# Patient Record
Sex: MALE | Race: BLACK OR AFRICAN AMERICAN | NOT HISPANIC OR LATINO | URBAN - METROPOLITAN AREA
[De-identification: names, ages, dates, MRNs, and addresses within clinical notes are randomized per-mention and may not be internally consistent; named-entity substitution may affect disease eponyms.]

---

## 2021-04-26 ENCOUNTER — INPATIENT (INPATIENT)
Facility: HOSPITAL | Age: 26
LOS: 1 days | Discharge: HOME | DRG: 975 | End: 2021-04-28
Attending: STUDENT IN AN ORGANIZED HEALTH CARE EDUCATION/TRAINING PROGRAM | Admitting: STUDENT IN AN ORGANIZED HEALTH CARE EDUCATION/TRAINING PROGRAM
Payer: COMMERCIAL

## 2021-04-26 VITALS
HEART RATE: 132 BPM | SYSTOLIC BLOOD PRESSURE: 98 MMHG | TEMPERATURE: 100 F | OXYGEN SATURATION: 99 % | RESPIRATION RATE: 18 BRPM | DIASTOLIC BLOOD PRESSURE: 64 MMHG | WEIGHT: 125 LBS

## 2021-04-26 DIAGNOSIS — R63.8 OTHER SYMPTOMS AND SIGNS CONCERNING FOOD AND FLUID INTAKE: ICD-10-CM

## 2021-04-26 DIAGNOSIS — D57.1 SICKLE-CELL DISEASE WITHOUT CRISIS: ICD-10-CM

## 2021-04-26 DIAGNOSIS — A41.9 SEPSIS, UNSPECIFIED ORGANISM: ICD-10-CM

## 2021-04-26 DIAGNOSIS — D47.3 ESSENTIAL (HEMORRHAGIC) THROMBOCYTHEMIA: ICD-10-CM

## 2021-04-26 DIAGNOSIS — D64.9 ANEMIA, UNSPECIFIED: ICD-10-CM

## 2021-04-26 DIAGNOSIS — B20 HUMAN IMMUNODEFICIENCY VIRUS [HIV] DISEASE: ICD-10-CM

## 2021-04-26 DIAGNOSIS — N28.9 DISORDER OF KIDNEY AND URETER, UNSPECIFIED: ICD-10-CM

## 2021-04-26 LAB
ALBUMIN SERPL ELPH-MCNC: 2.4 G/DL — LOW (ref 3.4–5)
ALP SERPL-CCNC: 175 U/L — HIGH (ref 40–120)
ALT FLD-CCNC: 86 U/L — HIGH (ref 12–42)
ANION GAP SERPL CALC-SCNC: 12 MMOL/L — SIGNIFICANT CHANGE UP (ref 9–16)
ANISOCYTOSIS BLD QL: SLIGHT — SIGNIFICANT CHANGE UP
APTT BLD: 33.1 SEC — SIGNIFICANT CHANGE UP (ref 27.5–35.5)
AST SERPL-CCNC: 119 U/L — HIGH (ref 15–37)
BASOPHILS # BLD AUTO: 0 K/UL — SIGNIFICANT CHANGE UP (ref 0–0.2)
BASOPHILS # BLD AUTO: 0 K/UL — SIGNIFICANT CHANGE UP (ref 0–0.2)
BASOPHILS NFR BLD AUTO: 0 % — SIGNIFICANT CHANGE UP (ref 0–2)
BASOPHILS NFR BLD AUTO: 0 % — SIGNIFICANT CHANGE UP (ref 0–2)
BILIRUB DIRECT SERPL-MCNC: 0.2 MG/DL — SIGNIFICANT CHANGE UP (ref 0–0.2)
BILIRUB INDIRECT FLD-MCNC: 0.2 MG/DL — SIGNIFICANT CHANGE UP (ref 0.2–1)
BILIRUB SERPL-MCNC: 0.4 MG/DL — SIGNIFICANT CHANGE UP (ref 0.2–1.2)
BILIRUB SERPL-MCNC: 0.6 MG/DL — SIGNIFICANT CHANGE UP (ref 0.2–1.2)
BLD GP AB SCN SERPL QL: NEGATIVE — SIGNIFICANT CHANGE UP
BLD GP AB SCN SERPL QL: NEGATIVE — SIGNIFICANT CHANGE UP
BUN SERPL-MCNC: 24 MG/DL — HIGH (ref 7–23)
CALCIUM SERPL-MCNC: 9.6 MG/DL — SIGNIFICANT CHANGE UP (ref 8.5–10.5)
CHLORIDE SERPL-SCNC: 96 MMOL/L — SIGNIFICANT CHANGE UP (ref 96–108)
CO2 SERPL-SCNC: 24 MMOL/L — SIGNIFICANT CHANGE UP (ref 22–31)
CREAT SERPL-MCNC: 2.08 MG/DL — HIGH (ref 0.5–1.3)
DACRYOCYTES BLD QL SMEAR: SLIGHT — SIGNIFICANT CHANGE UP
EOSINOPHIL # BLD AUTO: 0.03 K/UL — SIGNIFICANT CHANGE UP (ref 0–0.5)
EOSINOPHIL # BLD AUTO: 0.03 K/UL — SIGNIFICANT CHANGE UP (ref 0–0.5)
EOSINOPHIL NFR BLD AUTO: 0.9 % — SIGNIFICANT CHANGE UP (ref 0–6)
EOSINOPHIL NFR BLD AUTO: 1 % — SIGNIFICANT CHANGE UP (ref 0–6)
GIANT PLATELETS BLD QL SMEAR: PRESENT — SIGNIFICANT CHANGE UP
GLUCOSE SERPL-MCNC: 108 MG/DL — HIGH (ref 70–99)
HAPTOGLOB SERPL-MCNC: 621 MG/DL — HIGH (ref 34–200)
HCT VFR BLD CALC: 17.6 % — CRITICAL LOW (ref 39–50)
HCT VFR BLD CALC: 19 % — CRITICAL LOW (ref 39–50)
HGB BLD-MCNC: 5.4 G/DL — CRITICAL LOW (ref 13–17)
HGB BLD-MCNC: 6 G/DL — CRITICAL LOW (ref 13–17)
HYPOCHROMIA BLD QL: SIGNIFICANT CHANGE UP
HYPOCHROMIA BLD QL: SLIGHT — SIGNIFICANT CHANGE UP
INR BLD: 1.43 — HIGH (ref 0.88–1.16)
LACTATE SERPL-SCNC: 1.1 MMOL/L — SIGNIFICANT CHANGE UP (ref 0.4–2)
LIDOCAIN IGE QN: 24 U/L — SIGNIFICANT CHANGE UP (ref 7–60)
LYMPHOCYTES # BLD AUTO: 0.86 K/UL — LOW (ref 1–3.3)
LYMPHOCYTES # BLD AUTO: 0.87 K/UL — LOW (ref 1–3.3)
LYMPHOCYTES # BLD AUTO: 29 % — SIGNIFICANT CHANGE UP (ref 13–44)
LYMPHOCYTES # BLD AUTO: 30.3 % — SIGNIFICANT CHANGE UP (ref 13–44)
MACROCYTES BLD QL: SLIGHT — SIGNIFICANT CHANGE UP
MAGNESIUM SERPL-MCNC: 2 MG/DL — SIGNIFICANT CHANGE UP (ref 1.6–2.6)
MANUAL SMEAR VERIFICATION: SIGNIFICANT CHANGE UP
MANUAL SMEAR VERIFICATION: SIGNIFICANT CHANGE UP
MCHC RBC-ENTMCNC: 27.4 PG — SIGNIFICANT CHANGE UP (ref 27–34)
MCHC RBC-ENTMCNC: 28.3 PG — SIGNIFICANT CHANGE UP (ref 27–34)
MCHC RBC-ENTMCNC: 30.7 GM/DL — LOW (ref 32–36)
MCHC RBC-ENTMCNC: 31.6 GM/DL — LOW (ref 32–36)
MCV RBC AUTO: 89.3 FL — SIGNIFICANT CHANGE UP (ref 80–100)
MCV RBC AUTO: 89.6 FL — SIGNIFICANT CHANGE UP (ref 80–100)
METAMYELOCYTES # FLD: 1.8 % — HIGH (ref 0–0)
MONOCYTES # BLD AUTO: 0.36 K/UL — SIGNIFICANT CHANGE UP (ref 0–0.9)
MONOCYTES # BLD AUTO: 0.45 K/UL — SIGNIFICANT CHANGE UP (ref 0–0.9)
MONOCYTES NFR BLD AUTO: 12.5 % — SIGNIFICANT CHANGE UP (ref 2–14)
MONOCYTES NFR BLD AUTO: 15 % — HIGH (ref 2–14)
NEUTROPHILS # BLD AUTO: 1.53 K/UL — LOW (ref 1.8–7.4)
NEUTROPHILS # BLD AUTO: 1.65 K/UL — LOW (ref 1.8–7.4)
NEUTROPHILS NFR BLD AUTO: 49 % — SIGNIFICANT CHANGE UP (ref 43–77)
NEUTROPHILS NFR BLD AUTO: 51.8 % — SIGNIFICANT CHANGE UP (ref 43–77)
NEUTS BAND # BLD: 1.8 % — SIGNIFICANT CHANGE UP (ref 0–8)
NEUTS BAND # BLD: 6 % — SIGNIFICANT CHANGE UP (ref 0–8)
NRBC # BLD: 0 /100 — SIGNIFICANT CHANGE UP (ref 0–0)
NRBC # BLD: SIGNIFICANT CHANGE UP /100 WBCS (ref 0–0)
PLAT MORPH BLD: ABNORMAL
PLAT MORPH BLD: NORMAL — SIGNIFICANT CHANGE UP
PLATELET # BLD AUTO: 394 K/UL — SIGNIFICANT CHANGE UP (ref 150–400)
PLATELET # BLD AUTO: 412 K/UL — HIGH (ref 150–400)
POIKILOCYTOSIS BLD QL AUTO: SLIGHT — SIGNIFICANT CHANGE UP
POLYCHROMASIA BLD QL SMEAR: SLIGHT — SIGNIFICANT CHANGE UP
POLYCHROMASIA BLD QL SMEAR: SLIGHT — SIGNIFICANT CHANGE UP
POTASSIUM SERPL-MCNC: 4.1 MMOL/L — SIGNIFICANT CHANGE UP (ref 3.5–5.3)
POTASSIUM SERPL-SCNC: 4.1 MMOL/L — SIGNIFICANT CHANGE UP (ref 3.5–5.3)
PROT SERPL-MCNC: 10 G/DL — HIGH (ref 6.4–8.2)
PROTHROM AB SERPL-ACNC: 16.9 SEC — HIGH (ref 10.6–13.6)
RBC # BLD: 1.97 M/UL — LOW (ref 4.2–5.8)
RBC # BLD: 2.12 M/UL — LOW (ref 4.2–5.8)
RBC # FLD: 15 % — HIGH (ref 10.3–14.5)
RBC # FLD: 15.4 % — HIGH (ref 10.3–14.5)
RBC BLD AUTO: ABNORMAL
RBC BLD AUTO: ABNORMAL
RH IG SCN BLD-IMP: POSITIVE — SIGNIFICANT CHANGE UP
RH IG SCN BLD-IMP: POSITIVE — SIGNIFICANT CHANGE UP
SARS-COV-2 RNA SPEC QL NAA+PROBE: SIGNIFICANT CHANGE UP
SCHISTOCYTES BLD QL AUTO: SLIGHT — SIGNIFICANT CHANGE UP
SMUDGE CELLS # BLD: PRESENT — SIGNIFICANT CHANGE UP
SODIUM SERPL-SCNC: 132 MMOL/L — SIGNIFICANT CHANGE UP (ref 132–145)
TARGETS BLD QL SMEAR: SLIGHT — SIGNIFICANT CHANGE UP
VARIANT LYMPHS # BLD: 0.9 % — SIGNIFICANT CHANGE UP (ref 0–6)
WBC # BLD: 2.85 K/UL — LOW (ref 3.8–10.5)
WBC # BLD: 3 K/UL — LOW (ref 3.8–10.5)
WBC # FLD AUTO: 2.85 K/UL — LOW (ref 3.8–10.5)
WBC # FLD AUTO: 3 K/UL — LOW (ref 3.8–10.5)

## 2021-04-26 PROCEDURE — 70450 CT HEAD/BRAIN W/O DYE: CPT | Mod: 26

## 2021-04-26 PROCEDURE — 99285 EMERGENCY DEPT VISIT HI MDM: CPT | Mod: 25

## 2021-04-26 PROCEDURE — 99223 1ST HOSP IP/OBS HIGH 75: CPT

## 2021-04-26 PROCEDURE — 71045 X-RAY EXAM CHEST 1 VIEW: CPT | Mod: 26

## 2021-04-26 PROCEDURE — 93010 ELECTROCARDIOGRAM REPORT: CPT

## 2021-04-26 RX ORDER — CIPROFLOXACIN LACTATE 400MG/40ML
400 VIAL (ML) INTRAVENOUS EVERY 12 HOURS
Refills: 0 | Status: DISCONTINUED | OUTPATIENT
Start: 2021-04-26 | End: 2021-04-27

## 2021-04-26 RX ORDER — KETOROLAC TROMETHAMINE 30 MG/ML
15 SYRINGE (ML) INJECTION ONCE
Refills: 0 | Status: DISCONTINUED | OUTPATIENT
Start: 2021-04-26 | End: 2021-04-26

## 2021-04-26 RX ORDER — SODIUM CHLORIDE 9 MG/ML
1000 INJECTION INTRAMUSCULAR; INTRAVENOUS; SUBCUTANEOUS ONCE
Refills: 0 | Status: COMPLETED | OUTPATIENT
Start: 2021-04-26 | End: 2021-04-26

## 2021-04-26 RX ORDER — PIPERACILLIN AND TAZOBACTAM 4; .5 G/20ML; G/20ML
3.38 INJECTION, POWDER, LYOPHILIZED, FOR SOLUTION INTRAVENOUS ONCE
Refills: 0 | Status: COMPLETED | OUTPATIENT
Start: 2021-04-26 | End: 2021-04-26

## 2021-04-26 RX ORDER — MORPHINE SULFATE 50 MG/1
1 CAPSULE, EXTENDED RELEASE ORAL
Refills: 0 | Status: DISCONTINUED | OUTPATIENT
Start: 2021-04-26 | End: 2021-04-27

## 2021-04-26 RX ORDER — FLUCONAZOLE 150 MG/1
100 TABLET ORAL EVERY 24 HOURS
Refills: 0 | Status: DISCONTINUED | OUTPATIENT
Start: 2021-04-27 | End: 2021-04-28

## 2021-04-26 RX ORDER — METOCLOPRAMIDE HCL 10 MG
10 TABLET ORAL ONCE
Refills: 0 | Status: COMPLETED | OUTPATIENT
Start: 2021-04-26 | End: 2021-04-26

## 2021-04-26 RX ORDER — FLUCONAZOLE 150 MG/1
200 TABLET ORAL ONCE
Refills: 0 | Status: COMPLETED | OUTPATIENT
Start: 2021-04-26 | End: 2021-04-26

## 2021-04-26 RX ORDER — ACETAMINOPHEN 500 MG
1000 TABLET ORAL EVERY 6 HOURS
Refills: 0 | Status: DISCONTINUED | OUTPATIENT
Start: 2021-04-26 | End: 2021-04-27

## 2021-04-26 RX ORDER — SODIUM CHLORIDE 9 MG/ML
1750 INJECTION INTRAMUSCULAR; INTRAVENOUS; SUBCUTANEOUS ONCE
Refills: 0 | Status: COMPLETED | OUTPATIENT
Start: 2021-04-26 | End: 2021-04-26

## 2021-04-26 RX ORDER — ACETAMINOPHEN 500 MG
650 TABLET ORAL ONCE
Refills: 0 | Status: DISCONTINUED | OUTPATIENT
Start: 2021-04-26 | End: 2021-04-26

## 2021-04-26 RX ORDER — ACETAMINOPHEN 500 MG
650 TABLET ORAL ONCE
Refills: 0 | Status: COMPLETED | OUTPATIENT
Start: 2021-04-26 | End: 2021-04-26

## 2021-04-26 RX ORDER — ACETAMINOPHEN 500 MG
650 TABLET ORAL EVERY 4 HOURS
Refills: 0 | Status: DISCONTINUED | OUTPATIENT
Start: 2021-04-26 | End: 2021-04-26

## 2021-04-26 RX ORDER — METRONIDAZOLE 500 MG
500 TABLET ORAL EVERY 8 HOURS
Refills: 0 | Status: DISCONTINUED | OUTPATIENT
Start: 2021-04-26 | End: 2021-04-27

## 2021-04-26 RX ADMIN — Medication 10 MILLIGRAM(S): at 08:08

## 2021-04-26 RX ADMIN — FLUCONAZOLE 200 MILLIGRAM(S): 150 TABLET ORAL at 21:34

## 2021-04-26 RX ADMIN — PIPERACILLIN AND TAZOBACTAM 200 GRAM(S): 4; .5 INJECTION, POWDER, LYOPHILIZED, FOR SOLUTION INTRAVENOUS at 07:59

## 2021-04-26 RX ADMIN — Medication 200 MILLIGRAM(S): at 22:43

## 2021-04-26 RX ADMIN — Medication 15 MILLIGRAM(S): at 08:08

## 2021-04-26 RX ADMIN — Medication 650 MILLIGRAM(S): at 07:50

## 2021-04-26 RX ADMIN — SODIUM CHLORIDE 1000 MILLILITER(S): 9 INJECTION INTRAMUSCULAR; INTRAVENOUS; SUBCUTANEOUS at 13:13

## 2021-04-26 RX ADMIN — Medication 100 MILLIGRAM(S): at 19:47

## 2021-04-26 RX ADMIN — SODIUM CHLORIDE 1750 MILLILITER(S): 9 INJECTION INTRAMUSCULAR; INTRAVENOUS; SUBCUTANEOUS at 07:54

## 2021-04-26 NOTE — H&P ADULT - PROBLEM SELECTOR PLAN 1
pt w/ hx of HIV p/w tachycardia bandemia w/ n/v/hematochezia, subjective fever and chills. suspected GI source given recent bloody diarrhea. r/o colitis vs hepatitis vs pancreatitis. cholecystis unlikely. cxr unremarkable for pna. CT head w.o acute findings.   -s/p 2.75 L NS, zosyn 3.375g x1 in ED  -f/u UA w/ reflex  -f/u blood cultures  -f/u stool smear cultures, ova, parasites, c diff  -f/u HIV consult  -f/u hep panel  -f/u lipase   -f/u D-Dimer    #Hematochezia   pt w/ weeks of bloody diarrhea meeting sirs criteria. likely 2/2 to colitis  -plan as above  -consider CT abd/pelvis pt w/ hx of HIV p/w tachycardia bandemia w/ n/v/hematochezia, subjective fever and chills. suspected GI source given recent bloody diarrhea. r/o colitis vs hepatitis vs pancreatitis. cholecystis unlikely. cxr unremarkable for pna. CT head w.o acute findings.   -s/p 2.75 L NS, zosyn 3.375g x1 in ED  -f/u UA w/ reflex  -f/u blood cultures  -f/u stool smear cultures, ova, parasites, c diff  -f/u hep panel  -f/u lipase   -HIV consult, GI consult      #Hematochezia   pt w/ weeks of bloody diarrhea meeting sirs criteria. likely 2/2 to colitis  -plan as above  -consider CT abd/pelvis pt w/ hx of HIV p/w tachycardia bandemia w/ n/v/hematochezia, subjective fever and chills. suspected GI source given recent bloody diarrhea. r/o colitis vs hepatitis vs pancreatitis. cholecystis unlikely. cxr unremarkable for pna. CT head w.o acute findings.   -s/p 2.75 L NS, zosyn 3.375g x1 in ED  -f/u UA w/ reflex  -f/u blood cultures  -f/u stool smear cultures, ova, parasites, c diff, GI PCR  -f/u hep panel  -f/u lipase  -c/w cipro 400q12, flagyl 500q8  -GI following, recs pending  -needs ID/HIV consult      #Hematochezia   pt w/ weeks of bloody diarrhea meeting sirs criteria. likely 2/2 to colitis  -plan as above  -f/u CT abd/pelvis non con

## 2021-04-26 NOTE — H&P ADULT - PROBLEM SELECTOR PLAN 4
pt w/ hx of SS. p/w Hg of 6.0 w/ hematochezia  -pt bilirubin wnl  -f/u haptoglobin and iron studies  -transfuse Hg <7  -keep active T&S pt w/ hx of SS. p/w Hg of 6.0 w/ hematochezia  -pt bilirubin wnl  -f/u haptoglobin and iron studies  -ordered 2 units, f/u post trans cbc  -transfuse Hg <7  -keep active T&S

## 2021-04-26 NOTE — ED PROVIDER NOTE - OBJECTIVE STATEMENT
24 y/o male with PMH of Sickle Cell Anemia and HIV (CD4/VL unknown, noncompliant with meds) presents via EMS for 1 month of progressive generalized weakness, malaise, nonspecific abdominal discomfort, N/V/D. He reports having subjective fevers and chills but has not checked his temperature at home. The patient is uncooperative with much of the history, stating he is tired and hungry and is refusing to answer any further questions at this time, therefore limiting the remainder of the history.

## 2021-04-26 NOTE — ED ADULT TRIAGE NOTE - CHIEF COMPLAINT QUOTE
biba with complaints of abdominal pain, nausea, vomiting and diarrhea on and off for 1 month. Tachycardiac 130's, oral temp 100.0.

## 2021-04-26 NOTE — H&P ADULT - ATTENDING COMMENTS
26 yo male with a hx of Syphilis, SSA and HIV seen with sepsis, acute blood loss anemia, probable colitis, ENMANUEL and transaminitis. I saw and examined patient independently. I discussed the assessment and plan in details with the resident on duty. WIll plan to admit and transfuse 2 units of PRBC after stat type and cross match. Continue with zosyn empirically while awaiting results of UA, stool studies and blood culture. Plan is to tailor down the abx if indicated. Consult GI and HIV specialities. Repeat labs in am but will obtain post transfusional H/H 2 hours after transfusion is completed. a dose of 20 mg iv lasixs to be administered in b/w the 2 units.

## 2021-04-26 NOTE — ED PROVIDER NOTE - PROGRESS NOTE DETAILS
Labs reveal Hgb of 6.0 , Cr level of 2.08. Patient will not endorse his baseline Hgb level but states his last blood transfusion was in December 2020 at a hospital in the [place].

## 2021-04-26 NOTE — H&P ADULT - PROBLEM SELECTOR PLAN 5
pt w/ hx of SS. denies hx of admissions for SS crises. states last blood transfusion was 12/20. not on hydroxyurea  -hg on admission 6.0 likely 2/2 to hematochezia vs SS crisis (bili wnl)  -f/u haptoglobin and iron studies  -transfuse Hg <7  -keep active T&S  -Tylenol prn for pain

## 2021-04-26 NOTE — ED PROVIDER NOTE - CLINICAL SUMMARY MEDICAL DECISION MAKING FREE TEXT BOX
This is a 26 y/o male with Hx Sickle Cell Anemia and HIV (CD4/VL unknown, noncompliant with meds) BIBA for 1 month of progressive weakness, malaise, HA, N/V/D. Pt noted to be tachycardic and hypotensive with PO temp of 100 upon arrival. Sepsis code initiated, IVFs and IV ABX were started, antipyretics, labs drawn, EKG, imaging and repeated reassessments and further intervention as indicated pending labs and imaging results.

## 2021-04-26 NOTE — H&P ADULT - PROBLEM SELECTOR PLAN 3
pt p/w BUN/Cr 24/2.08. pt w.o known hx of CKD w/ weeks of N/V/D. lytes wnl  -s/p 2.750L NS in ED  -monitor UOP  -f/u am bmp  -consider urine lytes   -consider renal consult pt p/w BUN/Cr 24/2.08. pt w.o known hx of CKD w/ weeks of N/V/D. lytes wnl  -s/p 2.750L NS in ED  -monitor UOP  -f/u am bmp  -f/u urine lytes, serum osm  -consider renal consult

## 2021-04-26 NOTE — H&P ADULT - PROBLEM SELECTOR PLAN 7
Fluids: none  Electrolytes: Mg>2, K>4  Nutrition:  No IVF currently needed, replete lytes PRN  Prophylaxis: Lovenox   Activity: AAT, OOBTC  GI: none  C: FC  Dispo: Admit to [facility]

## 2021-04-26 NOTE — H&P ADULT - NSHPPHYSICALEXAM_GEN_ALL_CORE
PHYSICAL EXAM:    General: thin, NAD  HEENT: NC/AT; PERRL, anicteric sclera; thrush  Neck: supple, no lymphadenopathy  Cardiovascular: +S1/S2, RRR  Respiratory: CTA B/L; no W/R/R  Gastrointestinal: soft, generalized tenderness/ND; +BSx4, - [place] sign  Extremities: WWP; no edema, clubbing or cyanosis  Vascular: 2+ radial, DP/PT pulses B/L  Neurological: AAOx3; no focal deficits. hearing loss b/l  Psychiatric: pleasant mood and affect  Dermatologic: no appreciable wounds or damage to the skin

## 2021-04-26 NOTE — ED PROVIDER NOTE - CARE PLAN
Principal Discharge DX:	Symptomatic anemia  Secondary Diagnosis:	Renal insufficiency  Secondary Diagnosis:	Sepsis

## 2021-04-26 NOTE — ED PROVIDER NOTE - RAPID ASSESSMENT
pt with PMHx of HIV, non compliant with HAART, byt pt with PMHx of HIV, non compliant with HAART, on and off meds x few months, last admitted to hospital 12/2020, BIBA for generalized tingling, weakness, HA, nausea, diarrhea, and malaise x 1 month.  Noted tachycardiac and febrile on arrival, sepsis protocol initiated, weight based fluids, empiric IV abx, awaiting day team for full assessment

## 2021-04-26 NOTE — H&P ADULT - NSHPLABSRESULTS_GEN_ALL_CORE
.  LABS:                         6.0    3.00  )-----------( 412      ( 26 Apr 2021 08:02 )             19.0     04-26    132  |  96  |  24<H>  ----------------------------<  108<H>  4.1   |  24  |  2.08<H>    Ca    9.6      26 Apr 2021 08:02  Mg     2.0     04-26    TPro  10.0<H>  /  Alb  2.4<L>  /  TBili  0.6  /  DBili  x   /  AST  119<H>  /  ALT  86<H>  /  AlkPhos  175<H>  04-26    PT/INR - ( 26 Apr 2021 08:02 )   PT: 16.9 sec;   INR: 1.43          PTT - ( 26 Apr 2021 08:02 )  PTT:33.1 sec      Lactate, Blood: 1.1 mmoL/L (04-26 @ 08:02)      RADIOLOGY, EKG & ADDITIONAL TESTS: Reviewed.

## 2021-04-26 NOTE — H&P ADULT - HISTORY OF PRESENT ILLNESS
HPI: Mr Renteria is a 26yo male w/ pmh of SS and HIV (diagnosed 6 years ago not on HAART) presenting from [facility] w/ 2-3 weeks of nausea, non bloody non bilious vomiting, diarreah w/ bright red blood and generalized abdominal pain. pt states he was unable to leave the restroom due to constant diarrhea and the abdominal pain became unbearable which prompted him to go tot he ED. pt also endorsing 2 days of subjective fever and chill. sore throat and hearing loss, HA (photophobia, neck stiffness or vision loss). pt endorsing what he describes as a tingling sensation in his penis and urine frequency. denies dysuria or penile sores/ulcers. denies recent sexual activity. In regard to SS pt states he has never had a SS crisis, has never been admitted to the hospital for SS. last blood transfusion 12/2020. In regard to HIV pt states he was diagnosed approx. 6 years ago. would not answer how he contracted the virus. does not take HAART. does not follow with a PCP. has never been hospitalized for HIV in the past  -ROS otherwise negative    In the ED:  Initial vital signs: T: 100F, HR: 132, BP: 98/64, R: 18, SpO2: 99% on RA  Labs: significant for wbc 3, Hg 6, plt 412, 6% bands, 15% monocytes, lactate 1.1, PTT/PT/INR 33.1/16.9/1.43, BUN/Cr 24/2.08, bili 0.6, alk phos 175, ast/alt 119/86,   Imaging: CT head for No acute intracranial hemorrhage, transcortical infarction or mass effect.  CXR: unremarkable   EKG: sinus tachycardia, normal axis, pr qrs wnl, no acute ischemic changes  Medications: acetaminophen 650mg PO, ketorolac 15mg ivp x1,  reglan 10mg ivp, zosyn 3.375mg, 1750 IV bolus NS, 1L NS bolus  Consults: none      HPI: Mr Renteria is a 24yo male w/ pmh of SS and HIV (diagnosed 6 years ago not on HAART) presenting from [facility] w/ 2-3 weeks of nausea, non bloody non bilious vomiting, diarreah w/ bright red blood and generalized abdominal pain. pt states he was unable to leave the restroom due to constant diarrhea and the abdominal pain became unbearable which prompted him to go tot he ED. pt also endorsing 2 days of subjective fever and chill. sore throat and hearing loss, HA (photophobia, neck stiffness or vision loss). pt endorsing what he describes as a tingling sensation in his penis and urine frequency. denies dysuria or penile sores/ulcers. denies recent sexual activity. In regard to SS pt states he has never had a SS crisis, has never been admitted to the hospital for SS. last blood transfusion 12/2020 (he denies being in crisis at the time or actively bleeding). In regard to HIV pt states he was diagnosed approx. 6 years ago. would not answer how he contracted the virus. does not take HAART. does not follow with a PCP. has never been hospitalized for HIV in the past  -ROS otherwise negative    In the ED:  Initial vital signs: T: 100F, HR: 132, BP: 98/64, R: 18, SpO2: 99% on RA  Labs: significant for wbc 3.0, Hg 6.0, plt 412, 6% bands, 15% monocytes, lactate 1.1, PTT/PT/INR 33.1/16.9/1.43, BUN/Cr 24/2.08, bili 0.6, alk phos 175, ast/alt 119/86, COVID -  Imaging: CT head for No acute intracranial hemorrhage, transcortical infarction or mass effect.  CXR: unremarkable   EKG: sinus tachycardia, normal axis, pr qrs wnl, no acute ischemic changes  Medications: acetaminophen 650mg PO, ketorolac 15mg ivp x1,  reglan 10mg ivp, zosyn 3.375mg, 1750 IV bolus NS, 1L NS bolus  Consults: none      HPI: Mr Renteria is a 24yo male w/ pmh of SS and HIV (diagnosed 6 years ago not on HAART) presenting from [facility] w/ 2-3 weeks of nausea, non bloody non bilious vomiting, diarreah w/ bright red blood and generalized abdominal pain. pt states he was unable to leave the restroom due to constant diarrhea and the abdominal pain became unbearable which prompted him to go tot he ED. pt also endorsing 2 days of subjective fever and chill. sore throat and hearing loss, HA (without photophobia, neck stiffness or vision loss). pt endorsing what he describes as a tingling sensation in his penis and urine frequency. denies dysuria or penile sores/ulcers. denies recent sexual activity. In regard to SS pt states he has never had a SS crisis, has never been admitted to the hospital for SS. last blood transfusion 12/2020 (he denies being in crisis at the time or actively bleeding). In regard to HIV pt states he was diagnosed approx. 6 years ago. would not answer how he contracted the virus. does not take HAART. does not follow with a PCP. has never been hospitalized for HIV in the past  -ROS otherwise negative    In the ED:  Initial vital signs: T: 100F, HR: 132, BP: 98/64, R: 18, SpO2: 99% on RA  Labs: significant for wbc 3.0, Hg 6.0, plt 412, 6% bands, 15% monocytes, lactate 1.1, PTT/PT/INR 33.1/16.9/1.43, BUN/Cr 24/2.08, bili 0.6, alk phos 175, ast/alt 119/86, COVID -  Imaging: CT head for No acute intracranial hemorrhage, transcortical infarction or mass effect.  CXR: unremarkable   EKG: sinus tachycardia, normal axis, pr qrs wnl, no acute ischemic changes  Medications: acetaminophen 650mg PO, ketorolac 15mg ivp x1,  reglan 10mg ivp, zosyn 3.375mg, 1750 IV bolus NS, 1L NS bolus  Consults: none

## 2021-04-26 NOTE — H&P ADULT - PROBLEM SELECTOR PLAN 2
Pt w/ approx year hx of HIV. not on HAART. p/w leukopenia wbc 3.0 and oral thrush. pt would not share how he mae the virus. states he is currently not sexually active. not cooperative on exam.   -f/u GC/Chl urine  -f/u T cell subset, viral load  -HIV consult    #Thrush  -pt w/ hx of HIV and leukopenia p/w oral thrush.   -c/w oral fluconazole Pt w/ approx year hx of HIV. not on HAART. p/w leukopenia wbc 3.0 and oral thrush. pt would not share how he mae the virus. states he is currently not sexually active. not cooperative on exam.   -f/u GC/Chl urine  -f/u T cell subset, viral load  -needs ID/HIV consult    #Thrush  -pt w/ hx of HIV and leukopenia p/w oral thrush.   -c/w oral fluconazole

## 2021-04-26 NOTE — ED PROVIDER NOTE - ATTENDING CONTRIBUTION TO CARE
I have seen the pt, reviewed all pertinent clinical data, and I agree with the documentation/care/plan executed by ZACKARY Frederick.

## 2021-04-26 NOTE — H&P ADULT - PROBLEM SELECTOR PLAN 6
pt p/w plt 412 likely component of dehydration and reactive in setting of infection.  -cont to monitor

## 2021-04-26 NOTE — H&P ADULT - NSHPSOCIALHISTORY_GEN_ALL_CORE
Tobacco use: 1 PPD  EtOH use: occasional  Illicit drug use: denies     Living situation: in [place] with roommate Tobacco use: 1 PPD  EtOH use: occasional  Illicit drug use: marijuana, no IVDU    Living situation: in [place] with roommate

## 2021-04-26 NOTE — ED ADULT NURSE REASSESSMENT NOTE - NS ED NURSE REASSESS COMMENT FT1
pt states he has a hx of sickle cell anemia and his last blood transfusion was in dec 2020 [place]".

## 2021-04-26 NOTE — H&P ADULT - ASSESSMENT
Mr Renteria is a 26yo male w/ pmh of SS and HIV (diagnosed 6 years ago not on HAART) presenting from [facility] w/ 2-3 weeks of nausea, non bloody non bilious vomiting, diarrhea w/ bright red blood and generalized abdominal pain.

## 2021-04-27 LAB
4/8 RATIO: 0.04 RATIO — LOW (ref 0.9–3.6)
ABS CD8: 247 /UL — SIGNIFICANT CHANGE UP (ref 142–740)
ANION GAP SERPL CALC-SCNC: 11 MMOL/L — SIGNIFICANT CHANGE UP (ref 5–17)
ANISOCYTOSIS BLD QL: SLIGHT — SIGNIFICANT CHANGE UP
APPEARANCE UR: CLEAR — SIGNIFICANT CHANGE UP
BACTERIA # UR AUTO: PRESENT /HPF
BASOPHILS # BLD AUTO: 0.03 K/UL — SIGNIFICANT CHANGE UP (ref 0–0.2)
BASOPHILS NFR BLD AUTO: 0.9 % — SIGNIFICANT CHANGE UP (ref 0–2)
BILIRUB UR-MCNC: NEGATIVE — SIGNIFICANT CHANGE UP
BUN SERPL-MCNC: 22 MG/DL — SIGNIFICANT CHANGE UP (ref 7–23)
BURR CELLS BLD QL SMEAR: PRESENT — SIGNIFICANT CHANGE UP
CALCIUM SERPL-MCNC: 8.9 MG/DL — SIGNIFICANT CHANGE UP (ref 8.4–10.5)
CD16+CD56+ CELLS NFR BLD: 6 % — SIGNIFICANT CHANGE UP (ref 5–23)
CD16+CD56+ CELLS NFR SPEC: 40 /UL — LOW (ref 71–410)
CD19 BLASTS SPEC-ACNC: 255 /UL — SIGNIFICANT CHANGE UP (ref 84–469)
CD19 BLASTS SPEC-ACNC: 41 % — HIGH (ref 6–24)
CD3 BLASTS SPEC-ACNC: 311 /UL — LOW (ref 672–1870)
CD3 BLASTS SPEC-ACNC: 51 % — LOW (ref 59–83)
CD4 %: 2 % — LOW (ref 30–62)
CD8 %: 41 % — HIGH (ref 12–36)
CHLORIDE SERPL-SCNC: 106 MMOL/L — SIGNIFICANT CHANGE UP (ref 96–108)
CO2 SERPL-SCNC: 20 MMOL/L — LOW (ref 22–31)
COLOR SPEC: YELLOW — SIGNIFICANT CHANGE UP
COMMENT - URINE: SIGNIFICANT CHANGE UP
CREAT ?TM UR-MCNC: 59 MG/DL — SIGNIFICANT CHANGE UP
CREAT SERPL-MCNC: 1.64 MG/DL — HIGH (ref 0.5–1.3)
DIFF PNL FLD: ABNORMAL
EOSINOPHIL # BLD AUTO: 0 K/UL — SIGNIFICANT CHANGE UP (ref 0–0.5)
EOSINOPHIL NFR BLD AUTO: 0 % — SIGNIFICANT CHANGE UP (ref 0–6)
EPI CELLS # UR: SIGNIFICANT CHANGE UP /HPF (ref 0–5)
GIANT PLATELETS BLD QL SMEAR: PRESENT — SIGNIFICANT CHANGE UP
GLUCOSE SERPL-MCNC: 123 MG/DL — HIGH (ref 70–99)
GLUCOSE UR QL: NEGATIVE — SIGNIFICANT CHANGE UP
HBV CORE AB SER-ACNC: SIGNIFICANT CHANGE UP
HBV CORE IGM SER-ACNC: SIGNIFICANT CHANGE UP
HBV SURFACE AG SER-ACNC: SIGNIFICANT CHANGE UP
HCT VFR BLD CALC: 23.8 % — LOW (ref 39–50)
HCV AB S/CO SERPL IA: 0.04 S/CO — LOW
HCV AB SERPL-IMP: SIGNIFICANT CHANGE UP
HGB BLD-MCNC: 7.5 G/DL — LOW (ref 13–17)
KETONES UR-MCNC: NEGATIVE — SIGNIFICANT CHANGE UP
LEUKOCYTE ESTERASE UR-ACNC: ABNORMAL
LYMPHOCYTES # BLD AUTO: 0.5 K/UL — LOW (ref 1–3.3)
LYMPHOCYTES # BLD AUTO: 15.6 % — SIGNIFICANT CHANGE UP (ref 13–44)
MAGNESIUM SERPL-MCNC: 1.8 MG/DL — SIGNIFICANT CHANGE UP (ref 1.6–2.6)
MANUAL SMEAR VERIFICATION: SIGNIFICANT CHANGE UP
MCHC RBC-ENTMCNC: 26.3 PG — LOW (ref 27–34)
MCHC RBC-ENTMCNC: 31.5 GM/DL — LOW (ref 32–36)
MCV RBC AUTO: 83.5 FL — SIGNIFICANT CHANGE UP (ref 80–100)
MICROCYTES BLD QL: SLIGHT — SIGNIFICANT CHANGE UP
MONOCYTES # BLD AUTO: 0.31 K/UL — SIGNIFICANT CHANGE UP (ref 0–0.9)
MONOCYTES NFR BLD AUTO: 9.6 % — SIGNIFICANT CHANGE UP (ref 2–14)
NEUTROPHILS # BLD AUTO: 2.38 K/UL — SIGNIFICANT CHANGE UP (ref 1.8–7.4)
NEUTROPHILS NFR BLD AUTO: 73.9 % — SIGNIFICANT CHANGE UP (ref 43–77)
NITRITE UR-MCNC: NEGATIVE — SIGNIFICANT CHANGE UP
OSMOLALITY UR: 282 MOSM/KG — LOW (ref 300–900)
OVALOCYTES BLD QL SMEAR: SLIGHT — SIGNIFICANT CHANGE UP
PH UR: 5.5 — SIGNIFICANT CHANGE UP (ref 5–8)
PLAT MORPH BLD: ABNORMAL
PLATELET # BLD AUTO: 388 K/UL — SIGNIFICANT CHANGE UP (ref 150–400)
POIKILOCYTOSIS BLD QL AUTO: SLIGHT — SIGNIFICANT CHANGE UP
POTASSIUM SERPL-MCNC: 4.2 MMOL/L — SIGNIFICANT CHANGE UP (ref 3.5–5.3)
POTASSIUM SERPL-SCNC: 4.2 MMOL/L — SIGNIFICANT CHANGE UP (ref 3.5–5.3)
PROT UR-MCNC: NEGATIVE MG/DL — SIGNIFICANT CHANGE UP
RBC # BLD: 2.85 M/UL — LOW (ref 4.2–5.8)
RBC # FLD: 20.1 % — HIGH (ref 10.3–14.5)
RBC BLD AUTO: ABNORMAL
RBC CASTS # UR COMP ASSIST: > 10 /HPF
SCHISTOCYTES BLD QL AUTO: SLIGHT — SIGNIFICANT CHANGE UP
SODIUM SERPL-SCNC: 137 MMOL/L — SIGNIFICANT CHANGE UP (ref 135–145)
SODIUM UR-SCNC: 111 MMOL/L — SIGNIFICANT CHANGE UP
SP GR SPEC: 1.01 — SIGNIFICANT CHANGE UP (ref 1–1.03)
T-CELL CD4 SUBSET PNL BLD: 10 /UL — LOW (ref 489–1457)
TARGETS BLD QL SMEAR: SLIGHT — SIGNIFICANT CHANGE UP
UROBILINOGEN FLD QL: 0.2 E.U./DL — SIGNIFICANT CHANGE UP
WBC # BLD: 3.22 K/UL — LOW (ref 3.8–10.5)
WBC # FLD AUTO: 3.22 K/UL — LOW (ref 3.8–10.5)
WBC UR QL: > 10 /HPF

## 2021-04-27 PROCEDURE — 99222 1ST HOSP IP/OBS MODERATE 55: CPT

## 2021-04-27 PROCEDURE — 74176 CT ABD & PELVIS W/O CONTRAST: CPT | Mod: 26

## 2021-04-27 PROCEDURE — 99232 SBSQ HOSP IP/OBS MODERATE 35: CPT

## 2021-04-27 RX ORDER — ACETAMINOPHEN 500 MG
1000 TABLET ORAL ONCE
Refills: 0 | Status: COMPLETED | OUTPATIENT
Start: 2021-04-27 | End: 2021-04-27

## 2021-04-27 RX ORDER — PIPERACILLIN AND TAZOBACTAM 4; .5 G/20ML; G/20ML
3.38 INJECTION, POWDER, LYOPHILIZED, FOR SOLUTION INTRAVENOUS EVERY 6 HOURS
Refills: 0 | Status: DISCONTINUED | OUTPATIENT
Start: 2021-04-27 | End: 2021-04-28

## 2021-04-27 RX ORDER — DIPHENHYDRAMINE HCL 50 MG
10 CAPSULE ORAL ONCE
Refills: 0 | Status: DISCONTINUED | OUTPATIENT
Start: 2021-04-27 | End: 2021-04-27

## 2021-04-27 RX ORDER — ONDANSETRON 8 MG/1
4 TABLET, FILM COATED ORAL ONCE
Refills: 0 | Status: COMPLETED | OUTPATIENT
Start: 2021-04-27 | End: 2021-04-27

## 2021-04-27 RX ORDER — DIPHENHYDRAMINE HCL 50 MG
25 CAPSULE ORAL ONCE
Refills: 0 | Status: COMPLETED | OUTPATIENT
Start: 2021-04-27 | End: 2021-04-27

## 2021-04-27 RX ORDER — ATOVAQUONE 750 MG/5ML
1500 SUSPENSION ORAL DAILY
Refills: 0 | Status: DISCONTINUED | OUTPATIENT
Start: 2021-04-27 | End: 2021-04-28

## 2021-04-27 RX ADMIN — Medication 25 MILLIGRAM(S): at 01:33

## 2021-04-27 RX ADMIN — FLUCONAZOLE 100 MILLIGRAM(S): 150 TABLET ORAL at 18:36

## 2021-04-27 RX ADMIN — PIPERACILLIN AND TAZOBACTAM 200 GRAM(S): 4; .5 INJECTION, POWDER, LYOPHILIZED, FOR SOLUTION INTRAVENOUS at 11:47

## 2021-04-27 RX ADMIN — PIPERACILLIN AND TAZOBACTAM 200 GRAM(S): 4; .5 INJECTION, POWDER, LYOPHILIZED, FOR SOLUTION INTRAVENOUS at 17:50

## 2021-04-27 RX ADMIN — ONDANSETRON 4 MILLIGRAM(S): 8 TABLET, FILM COATED ORAL at 01:29

## 2021-04-27 RX ADMIN — Medication 30 MILLILITER(S): at 00:43

## 2021-04-27 RX ADMIN — Medication 1000 MILLIGRAM(S): at 02:44

## 2021-04-27 RX ADMIN — Medication 100 MILLIGRAM(S): at 07:41

## 2021-04-27 RX ADMIN — PIPERACILLIN AND TAZOBACTAM 200 GRAM(S): 4; .5 INJECTION, POWDER, LYOPHILIZED, FOR SOLUTION INTRAVENOUS at 22:18

## 2021-04-27 RX ADMIN — ATOVAQUONE 1500 MILLIGRAM(S): 750 SUSPENSION ORAL at 11:20

## 2021-04-27 RX ADMIN — Medication 400 MILLIGRAM(S): at 01:44

## 2021-04-27 NOTE — CONSULT NOTE ADULT - ASSESSMENT
26yo male w/ pmh of SS and HIV (diagnosed 6 years ago not on HAART) presenting from [facility] w/ 2-3 weeks of nausea, non bloody non bilious vomiting, diarrhea w/ bright red blood and generalized abdominal pain. GI consulted for n/v/d, blood in stool.     #Nausea, vomiting, diarrhea, blood in stool  - prodrome appears to be linked to symptoms of burning w/ urination, sensation of penile tingling, and one episode of small amount of blood in stool  - given severe immunodeficiency, along w/ fevers/chills, infectious etiologies are highest on differential  - agree w/ GI PCR, Neisseria, stool O+P, culture, C diff  - would also obtain syphilis studies  - f/u CT scan to assess for alternate more obvious cause of symptoms  - pending stool studies, clinical course, will consider inpatient flex sig/colonoscopy    Ashlie Colon MD  PGY-4, Gastroenterology Fellow  pager: [phone]

## 2021-04-27 NOTE — CHART NOTE - NSCHARTNOTEFT_GEN_A_CORE
CHIEF COMPLAINT:  Patient is a 25y old  Male who presents with a chief complaint of diarrhea, abdominal pain and blood in the stool  HPI:  26yo male w/ pmh of reported but not confirmed SS, as well as HIV (diagnosed 6 years ago not on HAART) presenting from [facility] w/ 2-3 weeks of nausea, non bloody non bilious vomiting, diarrhea w/ bright red blood and generalized abdominal pain. Also endorsing 2 days of subjective fever and chills, sore throat and hearing loss, HA (without photophobia, neck stiffness or vision loss) and burning in his penis. Never had a SS crisis, has never been admitted to the hospital for SS. Last blood transfusion 2020 (he denies being in crisis at the time or actively bleeding). In regard to HIV pt states he was diagnosed approx. 6 years ago. Is truly not aware with how he contracted the virus. Does not take HAART. On admission, had a low grade fever. Labs s/f wbc 3.0, Hg 6.0, plt 412, 6% bands, 15% monocytes, lactate 1.1, PTT/PT/INR 33.1/16.9/1.43, BUN/Cr 24/2.08, bili 0.6, alk phos 175, ast/alt 119/86. CTH negative and CXR and EKG wnl. Was given acetaminophen 650mg PO, ketorolac 15mg ivp x1,  reglan 10mg ivp, zosyn 3.375mg, 1750 IV bolus NS, 1L NS bolus.    Additional HPI: Patient notes he was diagnosed w/ HIV in  or . He was working at [employer] at the time and was unable to perform his clinical duties d/t frequent illness. He was hospitalized where he was reportedly diagnosed w/ HIV, PCP PNA and sickle cell disease. He states he was started on Biktarvy at the time which he took up until this January when he no longer  had a primary care doctor and has since been taking some of his friends pills intermittently. He notes his only history of OIs are PCP PNA when he was diagnosed and again approximately 2 years ago.     Outpatient HIV Provider: Unknown  Year of HIV Diagnosis: [id number]  T cell leda: Unk  Highest Viral Load: Unk  Current ARV regimen: Previously on Biktarvy  ARV adherence: Non-compliant  Previous ARV regimens: Biktarvy  Hx of Past Oppurtunistic Infections: PCP PNA x2    PAST MEDICAL & SURGICAL HISTORY:  HIV (human immunodeficiency virus infection)    Sickle cell anemia    Social Hx: Lives alone. Has not held a job since he was diagnosed w/ HIV. Previously worked at [employer]. Denies alcohol or recreational drug use    Sexual History: Patient reports only having sex w/ women. He notes inconsistent condom use as he is usually in a relationship.     Sexual Activity:  Condom Use: No  Number of Current Partners: 0; 2 in the past 1 year  Number of Lifetime Partners: "Cannot estimate"  History of STI's and Treatments: Denies    REVIEW OF SYSTEMS:  Constitutional: [ ] fevers [ ] chills [ ] fatigue [ ] malaise [ ] myalgias [ ] arthralgias [ ] weight loss  Eyes: [ ] double vision [ ] eye pain  [ ] visual changes  ENT: [ ] sore throat [ ] odynophagia [ ] mouth pain [ ] rhinorrhea  CV: [ ] chest pain [ ] shortness of breath  [ ] edema  Respiratory:  [ ] cough [ ] sputum production [ ] wheezing [ ] shortness of breath  GI: [ ] abdominal pain [ ] nausea [ ] vomiting [ ]  constipation [x ] diarrhea  : [ ] suprapubic pain [ x] dysuria [ ] polyuria [ ] penile/vaginal discharge [ ] genital lesions  Heme/Lymph: [ ] easy bleeding [ ] swollen lymph nodes  Skin: [ ] rashes [ ] skin lesions  Neuro: [ ] headache [ ] neck tenderness [ ] focal motor weakness [ ] sensory changes [ ] paresthesias    PHYSICAL EXAM:    Vital Signs Last 24 Hrs  T(C): 37.1 (2021 17:17), Max: 39.4 (2021 23:41)  T(F): 98.8 (2021 17:17), Max: 102.9 (2021 23:41)  HR: 95 (2021 17:17) (91 - 132)  BP: 109/73 (2021 17:17) (90/51 - 109/73)  BP(mean): --  RR: 20 (2021 17:17) (17 - 22)  SpO2: 99% (2021 17:17) (97% - 100%)    General: AO x 3, NAD, Comfortable  HEENT: PERRL/ EOMI, no scleral icterus, MMM, no JVD, no thyromegaly, good dentition, no oropharyngeal lesions, +thrush  Respiratory: CTA b/l, no wheezes, rales or rhonchi  Cardiovascular: Regular rate and rhythm, +S1 + S2, no murmurs rubs or gallops  Abdomen: Soft, NTND, normoactive bowel sounds, no rebound, no guarding, no suprapubic tenderness  : No warts, vesicles, ulcerations, genital lesions, urethral discharge  Rectal: No anal warts, external hemorrhoids, good rectal tone, prostate normal in size and consistency, Stool normal in color and consistency, blood in the vault.  Extremities: No cyanosis, no clubbing, no edema, pulses equal, no calf tenderness  Skin: No rashes, skin lesions, palmar rash, or plantar rash  Lymphatic: No cervical/supraclavicular LAD  Lymph: No lymphadenopathy, enlargement or tenderness detected in the submandibular nodes, anterior cervical nodes, posterior cervical nodes, supraclavicular node, axillary nodes, inguinal nodes  Neurological: CN II-XII grossly intact, follows commands, moves all extremities        MEDICATIONS  (STANDING):  atovaquone  Suspension 1500 milliGRAM(s) Oral daily  fluconAZOLE   Tablet 100 milliGRAM(s) Oral every 24 hours  piperacillin/tazobactam IVPB.. 3.375 Gram(s) IV Intermittent every 6 hours    MEDICATIONS  (PRN):  aluminum hydroxide/magnesium hydroxide/simethicone Suspension 30 milliLiter(s) Oral every 6 hours PRN Dyspepsia  morphine  - Injectable 1 milliGRAM(s) IV Push every 3 hours PRN Severe Pain (7 - 10)      Allergies    No Known Allergies    Intolerances      LABS:                        7.5    3.22  )-----------( 388      ( 2021 12:39 )             23.8                           7.5    3.22  )-----------( 388      ( 2021 12:39 )             23.8     Neutrophils:73.9   Bands:--   Lymphocytes:15.6   Monocytes:9.6   Eosinophils:0.0   Basophils:0.9    @ 12:39        137  |  106  |  22  ----------------------------<  123<H>  4.2   |  20<L>  |  1.64<H>    Ca    8.9      2021 12:39  Mg     1.8         TPro  x   /  Alb  x   /  TBili  0.4  /  DBili  0.2  /  AST  x   /  ALT  x   /  AlkPhos  x       PT/INR - ( 2021 08:02 )   PT: 16.9 sec;   INR: 1.43          PTT - ( 2021 08:02 )  PTT:33.1 sec  Urinalysis Basic - ( 2021 08:46 )    Color: Yellow / Appearance: Clear / S.010 / pH: x  Gluc: x / Ketone: NEGATIVE  / Bili: Negative / Urobili: 0.2 E.U./dL   Blood: x / Protein: NEGATIVE mg/dL / Nitrite: NEGATIVE   Leuk Esterase: Small / RBC: > 10 /HPF / WBC > 10 /HPF   Sq Epi: x / Non Sq Epi: 0-5 /HPF / Bacteria: Present /HPF        2 %  10 /uL      MICROBIOLOGY:      RADIOLOGY:    Assessment and plan:  26yo male w/ hx of AIDS (CD4 10, VL pending) previously on Biktarvy currently non-complaint w/ prior hx of PCP PNA, and reported but not confirmed SS presenting from [facility] w/ 2-3 weeks of nausea, non bloody non bilious vomiting, diarrhea w/ bright red blood and generalized abdominal pain admitted for colitis in the setting of AIDS.    #AIDS  Patient w/ CD4 10. VL unknown. Reports discontinuing Biktarvy in January but intermittently taking a friend's medication  - Would start atovaquone for PCP ppx in the setting of elevated Creatinine, low CD4 and prior hx of PCP PNA; no evidence of PCP infection at this time  - F/u Cryptococcal Ag in the serum, CMV PCR, hepatitis panel, RPR, GC/Chlamydia urine  - Will consider restarting ARVs     #Colitis  In the setting of patient w/ AIDS can be typical colitis or caused by OIs.   - Recommend Stool O&P, GI PCR, stool culture, cdiff    #ENMANUEL  Unclear cause. May be 2/2 prerenal in the setting of diarrhea vs. intrarenal in the setting of UTI vs. AIDS induced nephropathy  - Will f/u urine lytes  - Treat urine infection; f/u culture CHIEF COMPLAINT:  Patient is a 25y old  Male who presents with a chief complaint of diarrhea, abdominal pain and blood in the stool  HPI:  26yo male w/ pmh of reported but not confirmed SS, as well as HIV (diagnosed 6 years ago not on HAART) presenting from [facility] w/ 2-3 weeks of nausea, non bloody non bilious vomiting, diarrhea w/ bright red blood and generalized abdominal pain. Also endorsing 2 days of subjective fever and chills, sore throat and hearing loss, HA (without photophobia, neck stiffness or vision loss) and burning in his penis. Never had a SS crisis, has never been admitted to the hospital for SS. Last blood transfusion 2020 (he denies being in crisis at the time or actively bleeding). In regard to HIV pt states he was diagnosed approx. 6 years ago. Is truly not aware with how he contracted the virus. Does not take HAART. On admission, had a low grade fever. Labs s/f wbc 3.0, Hg 6.0, plt 412, 6% bands, 15% monocytes, lactate 1.1, PTT/PT/INR 33.1/16.9/1.43, BUN/Cr 24/2.08, bili 0.6, alk phos 175, ast/alt 119/86. CTH negative and CXR and EKG wnl. Was given acetaminophen 650mg PO, ketorolac 15mg ivp x1,  reglan 10mg ivp, zosyn 3.375mg, 1750 IV bolus NS, 1L NS bolus.    Additional HPI: Patient notes he was diagnosed w/ HIV in  or . He was working at [employer] at the time and was unable to perform his clinical duties d/t frequent illness. He was hospitalized where he was reportedly diagnosed w/ HIV, PCP PNA and sickle cell disease. He states he was started on Biktarvy at the time which he took up until this January when he no longer  had a primary care doctor and has since been taking some of his friends pills intermittently. He notes his only history of OIs are PCP PNA when he was diagnosed and again approximately 2 years ago.     Outpatient HIV Provider: Unknown  Year of HIV Diagnosis: [id number]  T cell leda: Unk  Highest Viral Load: Unk  Current ARV regimen: Previously on Biktarvy  ARV adherence: Non-compliant  Previous ARV regimens: Biktarvy  Hx of Past Oppurtunistic Infections: PCP PNA x2    PAST MEDICAL & SURGICAL HISTORY:  HIV (human immunodeficiency virus infection)    Sickle cell anemia    Social Hx: Lives alone. Has not held a job since he was diagnosed w/ HIV. Previously worked at [employer]. Denies alcohol or recreational drug use    Sexual History: Patient reports only having sex w/ women. He notes inconsistent condom use as he is usually in a relationship.     Sexual Activity:  Condom Use: No  Number of Current Partners: 0; 2 in the past 1 year  Number of Lifetime Partners: "Cannot estimate"  History of STI's and Treatments: Yes. Has gotten both IM injections in the gluteal area and the deltoid    REVIEW OF SYSTEMS:  Constitutional: [ ] fevers [ ] chills [ ] fatigue [ ] malaise [ ] myalgias [ ] arthralgias [ ] weight loss  Eyes: [ ] double vision [ ] eye pain  [ ] visual changes  ENT: [ ] sore throat [ ] odynophagia [ ] mouth pain [ ] rhinorrhea  CV: [ ] chest pain [ ] shortness of breath  [ ] edema  Respiratory:  [ ] cough [ ] sputum production [ ] wheezing [ ] shortness of breath  GI: [ ] abdominal pain [ ] nausea [ ] vomiting [ ]  constipation [x ] diarrhea  : [ ] suprapubic pain [ x] dysuria [ ] polyuria [ ] penile/vaginal discharge [ ] genital lesions  Heme/Lymph: [ ] easy bleeding [ ] swollen lymph nodes  Skin: [ ] rashes [ ] skin lesions  Neuro: [ ] headache [ ] neck tenderness [ ] focal motor weakness [ ] sensory changes [ ] paresthesias    PHYSICAL EXAM:    Vital Signs Last 24 Hrs  T(C): 37.1 (2021 17:17), Max: 39.4 (2021 23:41)  T(F): 98.8 (2021 17:17), Max: 102.9 (2021 23:41)  HR: 95 (2021 17:17) (91 - 132)  BP: 109/73 (2021 17:17) (90/51 - 109/73)  BP(mean): --  RR: 20 (2021 17:17) (17 - 22)  SpO2: 99% (2021 17:17) (97% - 100%)    General: AO x 3, NAD, Comfortable  HEENT: PERRL/ EOMI, no scleral icterus, MMM, no JVD, no thyromegaly, good dentition, no oropharyngeal lesions, +thrush  Respiratory: CTA b/l, no wheezes, rales or rhonchi  Cardiovascular: Regular rate and rhythm, +S1 + S2, no murmurs rubs or gallops  Abdomen: Soft, NTND, normoactive bowel sounds, no rebound, no guarding, no suprapubic tenderness  : No warts, vesicles, ulcerations, genital lesions, urethral discharge  Rectal: No anal warts, external hemorrhoids, good rectal tone, prostate normal in size and consistency, Stool normal in color and consistency, blood in the vault.  Extremities: No cyanosis, no clubbing, no edema, pulses equal, no calf tenderness  Skin: No rashes, skin lesions, palmar rash, or plantar rash  Lymphatic: No cervical/supraclavicular LAD  Lymph: No lymphadenopathy, enlargement or tenderness detected in the submandibular nodes, anterior cervical nodes, posterior cervical nodes, supraclavicular node, axillary nodes, inguinal nodes  Neurological: CN II-XII grossly intact, follows commands, moves all extremities        MEDICATIONS  (STANDING):  atovaquone  Suspension 1500 milliGRAM(s) Oral daily  fluconAZOLE   Tablet 100 milliGRAM(s) Oral every 24 hours  piperacillin/tazobactam IVPB.. 3.375 Gram(s) IV Intermittent every 6 hours    MEDICATIONS  (PRN):  aluminum hydroxide/magnesium hydroxide/simethicone Suspension 30 milliLiter(s) Oral every 6 hours PRN Dyspepsia  morphine  - Injectable 1 milliGRAM(s) IV Push every 3 hours PRN Severe Pain (7 - 10)      Allergies    No Known Allergies    Intolerances      LABS:                        7.5    3.22  )-----------( 388      ( 2021 12:39 )             23.8                           7.5    3.22  )-----------( 388      ( 2021 12:39 )             23.8     Neutrophils:73.9   Bands:--   Lymphocytes:15.6   Monocytes:9.6   Eosinophils:0.0   Basophils:0.9    @ 12:39        137  |  106  |  22  ----------------------------<  123<H>  4.2   |  20<L>  |  1.64<H>    Ca    8.9      2021 12:39  Mg     1.8         TPro  x   /  Alb  x   /  TBili  0.4  /  DBili  0.2  /  AST  x   /  ALT  x   /  AlkPhos  x       PT/INR - ( 2021 08:02 )   PT: 16.9 sec;   INR: 1.43          PTT - ( 2021 08:02 )  PTT:33.1 sec  Urinalysis Basic - ( 2021 08:46 )    Color: Yellow / Appearance: Clear / S.010 / pH: x  Gluc: x / Ketone: NEGATIVE  / Bili: Negative / Urobili: 0.2 E.U./dL   Blood: x / Protein: NEGATIVE mg/dL / Nitrite: NEGATIVE   Leuk Esterase: Small / RBC: > 10 /HPF / WBC > 10 /HPF   Sq Epi: x / Non Sq Epi: 0-5 /HPF / Bacteria: Present /HPF        2 %  10 /uL      MICROBIOLOGY:      RADIOLOGY:    Assessment and plan:  26yo male w/ hx of AIDS (CD4 10, VL pending) previously on Biktarvy currently non-complaint w/ prior hx of PCP PNA, and reported but not confirmed SS presenting from [facility] w/ 2-3 weeks of nausea, non bloody non bilious vomiting, diarrhea w/ bright red blood and generalized abdominal pain admitted for colitis in the setting of AIDS.    #AIDS  Patient w/ CD4 10. VL unknown. Reports discontinuing Biktarvy in January but intermittently taking a friend's medication  - Would start atovaquone for PCP ppx in the setting of elevated Creatinine, low CD4 and prior hx of PCP PNA; no evidence of PCP infection at this time  - F/u Cryptococcal Ag in the serum, CMV PCR, hepatitis panel, RPR, GC/Chlamydia urine  - Will consider restarting ARVs     #Colitis  In the setting of patient w/ AIDS can be typical colitis or caused by OIs.   - Recommend Stool O&P, GI PCR, stool culture, cdiff    #ENMANUEL  Unclear cause. May be 2/2 prerenal in the setting of diarrhea vs. intrarenal in the setting of UTI vs. AIDS induced nephropathy  - Will f/u urine lytes  - Treat urine infection; f/u culture CHIEF COMPLAINT:  Patient is a 25y old  Male who presents with a chief complaint of diarrhea, abdominal pain and blood in the stool  HPI:  24yo male w/ pmh of reported but not confirmed SS, as well as HIV (diagnosed 6 years ago not on HAART) presenting from [facility] w/ 2-3 weeks of nausea, non bloody non bilious vomiting, diarrhea w/ bright red blood and generalized abdominal pain. Also endorsing 2 days of subjective fever and chills, sore throat and hearing loss, HA (without photophobia, neck stiffness or vision loss) and burning in his penis. Never had a SS crisis, has never been admitted to the hospital for SS. Last blood transfusion 2020 (he denies being in crisis at the time or actively bleeding). In regard to HIV pt states he was diagnosed approx. 6 years ago. Is truly not aware with how he contracted the virus. Does not take HAART. On admission, had a low grade fever. Labs s/f wbc 3.0, Hg 6.0, plt 412, 6% bands, 15% monocytes, lactate 1.1, PTT/PT/INR 33.1/16.9/1.43, BUN/Cr 24/2.08, bili 0.6, alk phos 175, ast/alt 119/86. CTH negative and CXR and EKG wnl. Was given acetaminophen 650mg PO, ketorolac 15mg ivp x1,  reglan 10mg ivp, zosyn 3.375mg, 1750 IV bolus NS, 1L NS bolus.    Additional HPI: Patient notes he was diagnosed w/ HIV in  or . He was working at [employer] at the time and was unable to perform his clinical duties d/t frequent illness. He was hospitalized where he was reportedly diagnosed w/ HIV, PCP PNA and sickle cell disease. He states he was started on Biktarvy at the time which he took up until this January when he was discharged from a hospital with only a 1 month supply. Since, he has since been taking some of his friends pills intermittently. He previously had an HIV doctor Dr. Jessica Rush at [facility] but she moved locations and he stopped following up.  He notes his only history of OIs are PCP PNA when he was diagnosed and again approximately 2 years ago.     Outpatient HIV Provider: Unknown  Year of HIV Diagnosis: [id number]  T cell leda: Unk  Highest Viral Load: Unk  Current ARV regimen: Previously on Biktarvy  ARV adherence: Non-compliant  Previous ARV regimens: Biktarvy  Hx of Past Oppurtunistic Infections: PCP PNA x2    PAST MEDICAL & SURGICAL HISTORY:  HIV (human immunodeficiency virus infection)    Sickle cell anemia    Social Hx: Lives alone. Has not held a job since he was diagnosed w/ HIV. Previously worked at [employer]. Denies alcohol or recreational drug use    Sexual History: Patient reports only having sex w/ women. He notes inconsistent condom use as he is usually in a relationship.     Sexual Activity:  Condom Use: No  Number of Current Partners: 0; 2 in the past 1 year  Number of Lifetime Partners: "Cannot estimate"  History of STI's and Treatments: Yes. Has gotten both IM injections in the gluteal area and the deltoid    REVIEW OF SYSTEMS:  Constitutional: [ ] fevers [ ] chills [ ] fatigue [ ] malaise [ ] myalgias [ ] arthralgias [ ] weight loss  Eyes: [ ] double vision [ ] eye pain  [ ] visual changes  ENT: [ ] sore throat [ ] odynophagia [ ] mouth pain [ ] rhinorrhea  CV: [ ] chest pain [ ] shortness of breath  [ ] edema  Respiratory:  [ ] cough [ ] sputum production [ ] wheezing [ ] shortness of breath  GI: [ ] abdominal pain [ ] nausea [ ] vomiting [ ]  constipation [x ] diarrhea  : [ ] suprapubic pain [ x] dysuria [ ] polyuria [ ] penile/vaginal discharge [ ] genital lesions  Heme/Lymph: [ ] easy bleeding [ ] swollen lymph nodes  Skin: [ ] rashes [ ] skin lesions  Neuro: [ ] headache [ ] neck tenderness [ ] focal motor weakness [ ] sensory changes [ ] paresthesias    PHYSICAL EXAM:    Vital Signs Last 24 Hrs  T(C): 37.1 (2021 17:17), Max: 39.4 (2021 23:41)  T(F): 98.8 (2021 17:17), Max: 102.9 (2021 23:41)  HR: 95 (2021 17:17) (91 - 132)  BP: 109/73 (2021 17:17) (90/51 - 109/73)  BP(mean): --  RR: 20 (2021 17:17) (17 - 22)  SpO2: 99% (2021 17:17) (97% - 100%)    General: AO x 3, NAD, Comfortable  HEENT: PERRL/ EOMI, no scleral icterus, MMM, no JVD, no thyromegaly, good dentition, no oropharyngeal lesions, +thrush  Respiratory: CTA b/l, no wheezes, rales or rhonchi  Cardiovascular: Regular rate and rhythm, +S1 + S2, no murmurs rubs or gallops  Abdomen: Soft, NTND, normoactive bowel sounds, no rebound, no guarding, no suprapubic tenderness  : No warts, vesicles, ulcerations, genital lesions, urethral discharge  Rectal: No anal warts, external hemorrhoids, good rectal tone, prostate normal in size and consistency, Stool normal in color and consistency, blood in the vault.  Extremities: No cyanosis, no clubbing, no edema, pulses equal, no calf tenderness  Skin: No rashes, skin lesions, palmar rash, or plantar rash  Lymphatic: No cervical/supraclavicular LAD  Lymph: No lymphadenopathy, enlargement or tenderness detected in the submandibular nodes, anterior cervical nodes, posterior cervical nodes, supraclavicular node, axillary nodes, inguinal nodes  Neurological: CN II-XII grossly intact, follows commands, moves all extremities        MEDICATIONS  (STANDING):  atovaquone  Suspension 1500 milliGRAM(s) Oral daily  fluconAZOLE   Tablet 100 milliGRAM(s) Oral every 24 hours  piperacillin/tazobactam IVPB.. 3.375 Gram(s) IV Intermittent every 6 hours    MEDICATIONS  (PRN):  aluminum hydroxide/magnesium hydroxide/simethicone Suspension 30 milliLiter(s) Oral every 6 hours PRN Dyspepsia  morphine  - Injectable 1 milliGRAM(s) IV Push every 3 hours PRN Severe Pain (7 - 10)      Allergies    No Known Allergies    Intolerances      LABS:                        7.5    3.22  )-----------( 388      ( 2021 12:39 )             23.8                           7.5    3.22  )-----------( 388      ( 2021 12:39 )             23.8     Neutrophils:73.9   Bands:--   Lymphocytes:15.6   Monocytes:9.6   Eosinophils:0.0   Basophils:0.9    @ 12:39        137  |  106  |  22  ----------------------------<  123<H>  4.2   |  20<L>  |  1.64<H>    Ca    8.9      2021 12:39  Mg     1.8         TPro  x   /  Alb  x   /  TBili  0.4  /  DBili  0.2  /  AST  x   /  ALT  x   /  AlkPhos  x       PT/INR - ( 2021 08:02 )   PT: 16.9 sec;   INR: 1.43          PTT - ( 2021 08:02 )  PTT:33.1 sec  Urinalysis Basic - ( 2021 08:46 )    Color: Yellow / Appearance: Clear / S.010 / pH: x  Gluc: x / Ketone: NEGATIVE  / Bili: Negative / Urobili: 0.2 E.U./dL   Blood: x / Protein: NEGATIVE mg/dL / Nitrite: NEGATIVE   Leuk Esterase: Small / RBC: > 10 /HPF / WBC > 10 /HPF   Sq Epi: x / Non Sq Epi: 0-5 /HPF / Bacteria: Present /HPF        2 %  10 /uL      MICROBIOLOGY:      RADIOLOGY:    Assessment and plan:  24yo male w/ hx of AIDS (CD4 10, VL pending) previously on Biktarvy currently non-complaint w/ prior hx of PCP PNA, and reported but not confirmed SS presenting from [facility] w/ 2-3 weeks of nausea, non bloody non bilious vomiting, diarrhea w/ bright red blood and generalized abdominal pain admitted for colitis in the setting of AIDS.    #AIDS  Patient w/ CD4 10. VL unknown. Reports discontinuing Biktarvy in January but intermittently taking a friend's medication  - Would start atovaquone for PCP ppx in the setting of elevated Creatinine, low CD4 and prior hx of PCP PNA; no evidence of PCP infection at this time  - F/u Cryptococcal Ag in the serum, CMV PCR, hepatitis panel, RPR, GC/Chlamydia urine  - Will consider restarting ARVs     #Colitis  In the setting of patient w/ AIDS can be typical colitis or caused by OIs.   - Recommend Stool O&P, GI PCR, stool culture, cdiff    #ENMANUEL  Unclear cause. May be 2/2 prerenal in the setting of diarrhea vs. intrarenal in the setting of UTI vs. AIDS induced nephropathy though unlikely as patient w/ no protein in urine  - Will f/u urine lytes  - Treat urine infection; f/u culture

## 2021-04-27 NOTE — PROGRESS NOTE ADULT - PROBLEM SELECTOR PLAN 7
Fluids: none  Electrolytes: Mg>2, K>4  Nutrition:  No IVF currently needed, replete lytes PRN  Prophylaxis: Lovenox   Activity: AAT, OOBTC  GI: none  C: FC  Dispo: Admit to [facility]

## 2021-04-27 NOTE — CONSULT NOTE ADULT - SUBJECTIVE AND OBJECTIVE BOX
GASTROENTEROLOGY CONSULT NOTE  HPI:  HPI: Mr Renteria is a 26yo male w/ pmh of SS and HIV (diagnosed 6 years ago not on HAART) presenting from [facility] w/ 2-3 weeks of nausea, non bloody non bilious vomiting, diarreah w/ bright red blood and generalized abdominal pain. pt states he was unable to leave the restroom due to constant diarrhea and the abdominal pain became unbearable which prompted him to go tot he ED. pt also endorsing 2 days of subjective fever and chill. sore throat and hearing loss, HA (without photophobia, neck stiffness or vision loss). pt endorsing what he describes as a tingling sensation in his penis and urine frequency. denies dysuria or penile sores/ulcers. denies recent sexual activity. In regard to SS pt states he has never had a SS crisis, has never been admitted to the hospital for SS. last blood transfusion 12/2020 (he denies being in crisis at the time or actively bleeding). In regard to HIV pt states he was diagnosed approx. 6 years ago. would not answer how he contracted the virus. does not take HAART. does not follow with a PCP. has never been hospitalized for HIV in the past  -ROS otherwise negative    In the ED:  Initial vital signs: T: 100F, HR: 132, BP: 98/64, R: 18, SpO2: 99% on RA  Labs: significant for wbc 3.0, Hg 6.0, plt 412, 6% bands, 15% monocytes, lactate 1.1, PTT/PT/INR 33.1/16.9/1.43, BUN/Cr 24/2.08, bili 0.6, alk phos 175, ast/alt 119/86, COVID -  Imaging: CT head for No acute intracranial hemorrhage, transcortical infarction or mass effect.  CXR: unremarkable   EKG: sinus tachycardia, normal axis, pr qrs wnl, no acute ischemic changes  Medications: acetaminophen 650mg PO, ketorolac 15mg ivp x1,  reglan 10mg ivp, zosyn 3.375mg, 1750 IV bolus NS, 1L NS bolus  Consults: none    (26 Apr 2021 15:05)    GI consulted for n/v/d, blood in stool. Patient seen and examined at bedside.     Allergies    No Known Allergies    Intolerances      Home Medications:    MEDICATIONS:  MEDICATIONS  (STANDING):  atovaquone  Suspension 1500 milliGRAM(s) Oral daily  fluconAZOLE   Tablet 100 milliGRAM(s) Oral every 24 hours  piperacillin/tazobactam IVPB.. 3.375 Gram(s) IV Intermittent every 6 hours    MEDICATIONS  (PRN):  aluminum hydroxide/magnesium hydroxide/simethicone Suspension 30 milliLiter(s) Oral every 6 hours PRN Dyspepsia  morphine  - Injectable 1 milliGRAM(s) IV Push every 3 hours PRN Severe Pain (7 - 10)    PAST MEDICAL & SURGICAL HISTORY:  HIV (human immunodeficiency virus infection)    Sickle cell anemia      FAMILY HISTORY:    SOCIAL HISTORY:  Tobacco: [ ] Current, [ ] Former, [ ] Never; Pack Years:  Alcohol:  Illicit Drugs:    REVIEW OF SYSTEMS:  CONSTITUTIONAL: No weakness, fevers or chills  HEENT: No visual changes; No vertigo or throat pain   NECK: No pain or stiffness  RESPIRATORY: No cough, wheezing, hemoptysis; No shortness of breath  CARDIOVASCULAR: No chest pain or palpitations  GASTROINTESTINAL: As above.  GENITOURINARY: No dysuria, frequency or hematuria  NEUROLOGICAL: No numbness or weakness  SKIN: No itching, burning, rashes, or lesions   All other 10 review of systems is negative unless indicated above.    Vital Signs Last 24 Hrs  T(C): 36.8 (27 Apr 2021 12:01), Max: 39.4 (26 Apr 2021 23:41)  T(F): 98.3 (27 Apr 2021 12:01), Max: 102.9 (26 Apr 2021 23:41)  HR: 91 (27 Apr 2021 12:01) (85 - 132)  BP: 103/63 (27 Apr 2021 12:01) (90/51 - 109/70)  BP(mean): --  RR: 22 (27 Apr 2021 12:01) (17 - 22)  SpO2: 100% (27 Apr 2021 12:01) (97% - 100%)    04-26 @ 07:01  -  04-27 @ 07:00  --------------------------------------------------------  IN: 300 mL / OUT: 1250 mL / NET: -950 mL    04-27 @ 07:01 - 04-27 @ 13:12  --------------------------------------------------------  IN: 450 mL / OUT: 300 mL / NET: 150 mL        PHYSICAL EXAM:    General: thin male; in no acute distress  Eyes: Anicteric sclerae, moist conjunctivae  HENT: Moist mucous membranes  Neck: Trachea midline, supple  Lungs: Normal respiratory effort, no intercostal retractions  Cardiovascular: RRR  Abdomen: Soft, non-tender non-distended; Normal bowel sounds; No rebound or guarding  Extremities: Normal range of motion, No clubbing, cyanosis or edema  Neurological: Alert and oriented x3  Skin: Warm and dry. No obvious rash    LABS:                        7.5    3.22  )-----------( 388      ( 27 Apr 2021 12:39 )             23.8     04-26    132  |  96  |  24<H>  ----------------------------<  108<H>  4.1   |  24  |  2.08<H>    Ca    9.6      26 Apr 2021 08:02  Mg     2.0     04-26    TPro  x   /  Alb  x   /  TBili  0.4  /  DBili  0.2  /  AST  x   /  ALT  x   /  AlkPhos  x   04-26        PT/INR - ( 26 Apr 2021 08:02 )   PT: 16.9 sec;   INR: 1.43          PTT - ( 26 Apr 2021 08:02 )  PTT:33.1 sec    RADIOLOGY & ADDITIONAL STUDIES:     Reviewed

## 2021-04-27 NOTE — PROGRESS NOTE ADULT - ATTENDING COMMENTS
26yo male w/ pmh of SS and HIV (diagnosed 6 years ago not on HAART) presenting from [facility] w/ 2-3 weeks of nausea, non bloody non bilious vomiting, diarrhea w/ bright red blood and generalized abdominal pain.            - agree w/ GI PCR, Neisseria, stool O+P, culture, C diff  - would also obtain syphilis studies  - f/u CT scan to assess for alternate more obvious cause of symptoms  - pending stool studies, clinical course, will consider inpatient flex sig/colonoscopy Sacha is a 24 yo male with a hx of HIV but not on HAART. Questionable SSA. He was brought in yesterday with a complaint of n/v/d as well as abdominal  pain  and melan. + fever and chills, sore throat and difficulty with hearing. H/H on arrival was 6/19 for which he was typed and cross matched, ordered 2  units of PRBC of which he only received one unit last night. Found to have sepsis, anemia, mild hyponatremia, ENMANUEL, ? colitis and transaminitis. GI and HIV speacilists consulted. Will await H/H post transfusionally. Awaiting results of stool studies, GC/Chlamydia pcr, blood cxs as well. UA (+) and will dc Cipro/Flagyl. Resume Zosyn. S/P IVF administration, Repeat labs for renal status monitoring. CT abd/pelvis ordered yesterday is still pending to determine etiology of sxs . GI to consider inpatient flex sig/colonoscopy

## 2021-04-27 NOTE — PROGRESS NOTE ADULT - PROBLEM SELECTOR PLAN 4
pt w/ hx of SS. p/w Hg of 6.0 w/ hematochezia  -pt bilirubin wnl  -f/u haptoglobin and iron studies  -ordered 2 units, f/u post trans cbc  -transfuse Hg <7  -keep active T&S pt w/ hx of SS. p/w Hg of 6.0 w/ hematochezia  -pt bilirubin wnl  -ordered 2 units, post transfusion cbc 7.5  -transfuse Hg <7  -keep active T&S

## 2021-04-27 NOTE — PROGRESS NOTE ADULT - SUBJECTIVE AND OBJECTIVE BOX
INCOMPLETE    OVERNIGHT EVENTS: SAEED patient admitted yesterday. Had his transfusions delayed slightly because he was febrile.    Hospital Course:  Mr Renteria is a 24yo male w/ pmh of reported but not confirmed SS, as well as HIV (diagnosed 6 years ago not on HAART) presenting from [facility] w/ 2-3 weeks of nausea, non bloody non bilious vomiting, diarrhea w/ bright red blood and generalized abdominal pain. Also endorsing 2 days of subjective fever and chills, sore throat and hearing loss, HA (without photophobia, neck stiffness or vision loss) and burning in his penis. Never had a SS crisis, has never been admitted to the hospital for SS. Last blood transfusion 2020 (he denies being in crisis at the time or actively bleeding). In regard to HIV pt states he was diagnosed approx. 6 years ago. Is truly not aware with how he contracted the virus. Does not take HAART. On admission, had a low grade fever. Labs s/f wbc 3.0, Hg 6.0, plt 412, 6% bands, 15% monocytes, lactate 1.1, PTT/PT/INR 33.1/16.9/1.43, BUN/Cr 24/2.08, bili 0.6, alk phos 175, ast/alt 119/86. CTH negative and CXR and EKG wnl. Was given acetaminophen 650mg PO, ketorolac 15mg ivp x1,  reglan 10mg ivp, zosyn 3.375mg, 1750 IV bolus NS, 1L NS bolus.    SUBJECTIVE / INTERVAL HPI: Patient seen and examined at bedside. Received 2U of blood overnight with Hgb 7.5 this AM. Further history obtained including that patient was diagnosed with HIV in  at [place] when he presented with PCP pneumonia. He was on bictarvy but was not taking it regularly. At some point developed PCP again. Denies IVDU, pt denies MSM. This AM was very somnolent but endorsed difficulty and burning with urination. Denies chest pain, nausea, vomiting, abdominal pain.      VITAL SIGNS:  Vital Signs Last 24 Hrs  T(C): 36.8 (2021 12:01), Max: 39.4 (2021 23:41)  T(F): 98.3 (2021 12:01), Max: 102.9 (2021 23:41)  HR: 91 (2021 12:01) (85 - 132)  BP: 103/63 (2021 12:01) (85/63 - 109/70)  BP(mean): --  RR: 22 (2021 12:01) (17 - 22)  SpO2: 100% (2021 12:01) (96% - 100%)    PHYSICAL EXAM:    General: thin appearing male sleeping in bed in NAD  HEENT: NC/AT; PERRL, anicteric sclera; dry MM  Neck: supple  Cardiovascular: +S1/S2; RRR  Respiratory: CTA B/L; no W/R/R  Gastrointestinal: soft, NT/ND; +BSx4  Extremities: WWP; no edema, clubbing or cyanosis  Vascular: 2+ radial, DP pulses B/L  Neurological: AAOx3; no focal deficits    MEDICATIONS:  MEDICATIONS  (STANDING):  atovaquone  Suspension 1500 milliGRAM(s) Oral daily  fluconAZOLE   Tablet 100 milliGRAM(s) Oral every 24 hours  piperacillin/tazobactam IVPB.. 3.375 Gram(s) IV Intermittent every 6 hours    MEDICATIONS  (PRN):  aluminum hydroxide/magnesium hydroxide/simethicone Suspension 30 milliLiter(s) Oral every 6 hours PRN Dyspepsia  morphine  - Injectable 1 milliGRAM(s) IV Push every 3 hours PRN Severe Pain (7 - 10)      ALLERGIES:  Allergies    No Known Allergies    Intolerances        LABS:                        7.5    3.22  )-----------( 388      ( 2021 12:39 )             23.8         132  |  96  |  24<H>  ----------------------------<  108<H>  4.1   |  24  |  2.08<H>    Ca    9.6      2021 08:02  Mg     2.0         TPro  x   /  Alb  x   /  TBili  0.4  /  DBili  0.2  /  AST  x   /  ALT  x   /  AlkPhos  x       PT/INR - ( 2021 08:02 )   PT: 16.9 sec;   INR: 1.43          PTT - ( 2021 08:02 )  PTT:33.1 sec  Urinalysis Basic - ( 2021 08:46 )    Color: Yellow / Appearance: Clear / S.010 / pH: x  Gluc: x / Ketone: NEGATIVE  / Bili: Negative / Urobili: 0.2 E.U./dL   Blood: x / Protein: NEGATIVE mg/dL / Nitrite: NEGATIVE   Leuk Esterase: Small / RBC: > 10 /HPF / WBC > 10 /HPF   Sq Epi: x / Non Sq Epi: 0-5 /HPF / Bacteria: Present /HPF      CAPILLARY BLOOD GLUCOSE          RADIOLOGY & ADDITIONAL TESTS: Reviewed.   OVERNIGHT EVENTS: SAEED patient admitted yesterday. Had his transfusions delayed slightly because he was febrile.    Hospital Course:  Mr Renteria is a 24yo male w/ pmh of reported but not confirmed SS, as well as HIV (diagnosed 6 years ago not on HAART) presenting from [facility] w/ 2-3 weeks of nausea, non bloody non bilious vomiting, diarrhea w/ bright red blood and generalized abdominal pain. Also endorsing 2 days of subjective fever and chills, sore throat and hearing loss, HA (without photophobia, neck stiffness or vision loss) and burning in his penis. Never had a SS crisis, has never been admitted to the hospital for SS. Last blood transfusion 2020 (he denies being in crisis at the time or actively bleeding). In regard to HIV pt states he was diagnosed approx. 6 years ago. Is truly not aware with how he contracted the virus. Does not take HAART. On admission, had a low grade fever. Labs s/f wbc 3.0, Hg 6.0, plt 412, 6% bands, 15% monocytes, lactate 1.1, PTT/PT/INR 33.1/16.9/1.43, BUN/Cr 24/2.08, bili 0.6, alk phos 175, ast/alt 119/86. CTH negative and CXR and EKG wnl. Was given acetaminophen 650mg PO, ketorolac 15mg ivp x1,  reglan 10mg ivp, zosyn 3.375mg, 1750 IV bolus NS, 1L NS bolus.    SUBJECTIVE / INTERVAL HPI: Patient seen and examined at bedside. Received 2U of blood overnight with Hgb 7.5 this AM. Further history obtained including that patient was diagnosed with HIV in  at [place] when he presented with PCP pneumonia. He was on bictarvy but was not taking it regularly. At some point developed PCP again. Denies IVDU, pt denies MSM. This AM was very somnolent but endorsed difficulty and burning with urination. Denies chest pain, nausea, vomiting, abdominal pain.      VITAL SIGNS:  Vital Signs Last 24 Hrs  T(C): 36.8 (2021 12:01), Max: 39.4 (2021 23:41)  T(F): 98.3 (2021 12:01), Max: 102.9 (2021 23:41)  HR: 91 (2021 12:01) (85 - 132)  BP: 103/63 (2021 12:01) (85/63 - 109/70)  BP(mean): --  RR: 22 (2021 12:01) (17 - 22)  SpO2: 100% (2021 12:01) (96% - 100%)    PHYSICAL EXAM:    General: thin appearing male sleeping in bed in NAD  HEENT: NC/AT; PERRL, anicteric sclera; dry MM  Neck: supple  Cardiovascular: +S1/S2; RRR  Respiratory: CTA B/L; no W/R/R  Gastrointestinal: soft, NT/ND; +BSx4  Extremities: WWP; no edema, clubbing or cyanosis  Vascular: 2+ radial, DP pulses B/L  Neurological: AAOx3; no focal deficits    MEDICATIONS:  MEDICATIONS  (STANDING):  atovaquone  Suspension 1500 milliGRAM(s) Oral daily  fluconAZOLE   Tablet 100 milliGRAM(s) Oral every 24 hours  piperacillin/tazobactam IVPB.. 3.375 Gram(s) IV Intermittent every 6 hours    MEDICATIONS  (PRN):  aluminum hydroxide/magnesium hydroxide/simethicone Suspension 30 milliLiter(s) Oral every 6 hours PRN Dyspepsia  morphine  - Injectable 1 milliGRAM(s) IV Push every 3 hours PRN Severe Pain (7 - 10)      ALLERGIES:  Allergies    No Known Allergies    Intolerances        LABS:                        7.5    3.22  )-----------( 388      ( 2021 12:39 )             23.8         132  |  96  |  24<H>  ----------------------------<  108<H>  4.1   |  24  |  2.08<H>    Ca    9.6      2021 08:02  Mg     2.0         TPro  x   /  Alb  x   /  TBili  0.4  /  DBili  0.2  /  AST  x   /  ALT  x   /  AlkPhos  x       PT/INR - ( 2021 08:02 )   PT: 16.9 sec;   INR: 1.43          PTT - ( 2021 08:02 )  PTT:33.1 sec  Urinalysis Basic - ( 2021 08:46 )    Color: Yellow / Appearance: Clear / S.010 / pH: x  Gluc: x / Ketone: NEGATIVE  / Bili: Negative / Urobili: 0.2 E.U./dL   Blood: x / Protein: NEGATIVE mg/dL / Nitrite: NEGATIVE   Leuk Esterase: Small / RBC: > 10 /HPF / WBC > 10 /HPF   Sq Epi: x / Non Sq Epi: 0-5 /HPF / Bacteria: Present /HPF      CAPILLARY BLOOD GLUCOSE          RADIOLOGY & ADDITIONAL TESTS: Reviewed.

## 2021-04-27 NOTE — PROGRESS NOTE ADULT - PROBLEM SELECTOR PLAN 2
Pt w/ approx year hx of HIV. not on HAART. p/w leukopenia wbc 3.0 and oral thrush.  -f/u GC/Chl urine  - T cell subset shows CD4 count 10  - started prophylaxis with atovaquone  - patient was prescribed     #Thrush  -pt w/ hx of HIV and leukopenia p/w oral thrush.   -c/w oral fluconazole

## 2021-04-27 NOTE — CONSULT NOTE ADULT - ATTENDING COMMENTS
Given low CD4, ddx for diarrhea is broad.   He understands that if testing is nondiagnostic or sx don't resolve, then we would recc colonoscopy for evaluation.

## 2021-04-27 NOTE — PROGRESS NOTE ADULT - PROBLEM SELECTOR PLAN 1
pt w/ hx of HIV p/w tachycardia bandemia w/ n/v/hematochezia, subjective fever and chills. suspected GI source given recent bloody diarrhea. r/o colitis vs hepatitis vs pancreatitis. cholecystis unlikely. cxr unremarkable for pna. CT head w.o acute findings.   -s/p 2.75 L NS, zosyn 3.375g x1 in ED  - c/w zosyn pending the result of urine/blood cultures  -f/u UA w/ reflex  -f/u blood cultures  -f/u stool smear cultures, ova, parasites, c diff, GI PCR  -f/u hep panel  -f/u lipase  -GI following, recs pending    #Hematochezia   pt w/ weeks of bloody diarrhea meeting sirs criteria. likely 2/2 to colitis  -plan as above  -f/u CT abd/pelvis non con

## 2021-04-28 ENCOUNTER — TRANSCRIPTION ENCOUNTER (OUTPATIENT)
Age: 26
End: 2021-04-28

## 2021-04-28 VITALS
RESPIRATION RATE: 18 BRPM | SYSTOLIC BLOOD PRESSURE: 105 MMHG | TEMPERATURE: 98 F | HEART RATE: 94 BPM | OXYGEN SATURATION: 99 % | DIASTOLIC BLOOD PRESSURE: 66 MMHG

## 2021-04-28 LAB
ANION GAP SERPL CALC-SCNC: 13 MMOL/L — SIGNIFICANT CHANGE UP (ref 5–17)
BLD GP AB SCN SERPL QL: NEGATIVE — SIGNIFICANT CHANGE UP
BUN SERPL-MCNC: 16 MG/DL — SIGNIFICANT CHANGE UP (ref 7–23)
C TRACH RRNA SPEC QL NAA+PROBE: SIGNIFICANT CHANGE UP
CALCIUM SERPL-MCNC: 9.3 MG/DL — SIGNIFICANT CHANGE UP (ref 8.4–10.5)
CHLORIDE SERPL-SCNC: 103 MMOL/L — SIGNIFICANT CHANGE UP (ref 96–108)
CO2 SERPL-SCNC: 19 MMOL/L — LOW (ref 22–31)
CREAT SERPL-MCNC: 1.53 MG/DL — HIGH (ref 0.5–1.3)
CULTURE RESULTS: NO GROWTH — SIGNIFICANT CHANGE UP
GLUCOSE SERPL-MCNC: 128 MG/DL — HIGH (ref 70–99)
HCT VFR BLD CALC: 23.4 % — LOW (ref 39–50)
HCT VFR BLD CALC: 25.8 % — LOW (ref 39–50)
HGB BLD-MCNC: 7.3 G/DL — LOW (ref 13–17)
HGB BLD-MCNC: 8 G/DL — LOW (ref 13–17)
HIV-1 VIRAL LOAD RESULT: ABNORMAL
HIV1 RNA # SERPL NAA+PROBE: 719 — SIGNIFICANT CHANGE UP
HIV1 RNA SER-IMP: SIGNIFICANT CHANGE UP
HIV1 RNA SERPL NAA+PROBE-ACNC: ABNORMAL
HIV1 RNA SERPL NAA+PROBE-LOG#: 2.86 — SIGNIFICANT CHANGE UP
MAGNESIUM SERPL-MCNC: 1.9 MG/DL — SIGNIFICANT CHANGE UP (ref 1.6–2.6)
MCHC RBC-ENTMCNC: 26 PG — LOW (ref 27–34)
MCHC RBC-ENTMCNC: 26.3 PG — LOW (ref 27–34)
MCHC RBC-ENTMCNC: 31 GM/DL — LOW (ref 32–36)
MCHC RBC-ENTMCNC: 31.2 GM/DL — LOW (ref 32–36)
MCV RBC AUTO: 83.3 FL — SIGNIFICANT CHANGE UP (ref 80–100)
MCV RBC AUTO: 84.9 FL — SIGNIFICANT CHANGE UP (ref 80–100)
N GONORRHOEA RRNA SPEC QL NAA+PROBE: SIGNIFICANT CHANGE UP
NRBC # BLD: 0 /100 WBCS — SIGNIFICANT CHANGE UP (ref 0–0)
NRBC # BLD: 0 /100 WBCS — SIGNIFICANT CHANGE UP (ref 0–0)
PHOSPHATE SERPL-MCNC: 3.4 MG/DL — SIGNIFICANT CHANGE UP (ref 2.5–4.5)
PLATELET # BLD AUTO: 411 K/UL — HIGH (ref 150–400)
PLATELET # BLD AUTO: 415 K/UL — HIGH (ref 150–400)
POTASSIUM SERPL-MCNC: 4 MMOL/L — SIGNIFICANT CHANGE UP (ref 3.5–5.3)
POTASSIUM SERPL-SCNC: 4 MMOL/L — SIGNIFICANT CHANGE UP (ref 3.5–5.3)
RBC # BLD: 2.81 M/UL — LOW (ref 4.2–5.8)
RBC # BLD: 3.04 M/UL — LOW (ref 4.2–5.8)
RBC # FLD: 20.5 % — HIGH (ref 10.3–14.5)
RBC # FLD: 20.6 % — HIGH (ref 10.3–14.5)
RH IG SCN BLD-IMP: POSITIVE — SIGNIFICANT CHANGE UP
RPR SER-TITR: (no result)
RPR SERPL-ACNC: REACTIVE
SODIUM SERPL-SCNC: 135 MMOL/L — SIGNIFICANT CHANGE UP (ref 135–145)
SPECIMEN SOURCE: SIGNIFICANT CHANGE UP
T PALLIDUM AB TITR SER: POSITIVE
WBC # BLD: 3.08 K/UL — LOW (ref 3.8–10.5)
WBC # BLD: 3.21 K/UL — LOW (ref 3.8–10.5)
WBC # FLD AUTO: 3.08 K/UL — LOW (ref 3.8–10.5)
WBC # FLD AUTO: 3.21 K/UL — LOW (ref 3.8–10.5)

## 2021-04-28 PROCEDURE — 86780 TREPONEMA PALLIDUM: CPT

## 2021-04-28 PROCEDURE — 99233 SBSQ HOSP IP/OBS HIGH 50: CPT | Mod: GC

## 2021-04-28 PROCEDURE — 83690 ASSAY OF LIPASE: CPT

## 2021-04-28 PROCEDURE — 99238 HOSP IP/OBS DSCHRG MGMT 30/<: CPT

## 2021-04-28 PROCEDURE — 82570 ASSAY OF URINE CREATININE: CPT

## 2021-04-28 PROCEDURE — 80053 COMPREHEN METABOLIC PANEL: CPT

## 2021-04-28 PROCEDURE — 86592 SYPHILIS TEST NON-TREP QUAL: CPT

## 2021-04-28 PROCEDURE — 87086 URINE CULTURE/COLONY COUNT: CPT

## 2021-04-28 PROCEDURE — 81001 URINALYSIS AUTO W/SCOPE: CPT

## 2021-04-28 PROCEDURE — 85027 COMPLETE CBC AUTOMATED: CPT

## 2021-04-28 PROCEDURE — 99285 EMERGENCY DEPT VISIT HI MDM: CPT | Mod: 25

## 2021-04-28 PROCEDURE — 86900 BLOOD TYPING SEROLOGIC ABO: CPT

## 2021-04-28 PROCEDURE — 82247 BILIRUBIN TOTAL: CPT

## 2021-04-28 PROCEDURE — 71045 X-RAY EXAM CHEST 1 VIEW: CPT

## 2021-04-28 PROCEDURE — 87491 CHLMYD TRACH DNA AMP PROBE: CPT

## 2021-04-28 PROCEDURE — 85730 THROMBOPLASTIN TIME PARTIAL: CPT

## 2021-04-28 PROCEDURE — 87340 HEPATITIS B SURFACE AG IA: CPT

## 2021-04-28 PROCEDURE — 86901 BLOOD TYPING SEROLOGIC RH(D): CPT

## 2021-04-28 PROCEDURE — 87536 HIV-1 QUANT&REVRSE TRNSCRPJ: CPT

## 2021-04-28 PROCEDURE — 83605 ASSAY OF LACTIC ACID: CPT

## 2021-04-28 PROCEDURE — 86360 T CELL ABSOLUTE COUNT/RATIO: CPT

## 2021-04-28 PROCEDURE — 86355 B CELLS TOTAL COUNT: CPT

## 2021-04-28 PROCEDURE — 86803 HEPATITIS C AB TEST: CPT

## 2021-04-28 PROCEDURE — 86850 RBC ANTIBODY SCREEN: CPT

## 2021-04-28 PROCEDURE — P9016: CPT

## 2021-04-28 PROCEDURE — 83935 ASSAY OF URINE OSMOLALITY: CPT

## 2021-04-28 PROCEDURE — 86359 T CELLS TOTAL COUNT: CPT

## 2021-04-28 PROCEDURE — 83010 ASSAY OF HAPTOGLOBIN QUANT: CPT

## 2021-04-28 PROCEDURE — 86923 COMPATIBILITY TEST ELECTRIC: CPT

## 2021-04-28 PROCEDURE — 83735 ASSAY OF MAGNESIUM: CPT

## 2021-04-28 PROCEDURE — 86705 HEP B CORE ANTIBODY IGM: CPT

## 2021-04-28 PROCEDURE — 87040 BLOOD CULTURE FOR BACTERIA: CPT

## 2021-04-28 PROCEDURE — 86403 PARTICLE AGGLUT ANTBDY SCRN: CPT

## 2021-04-28 PROCEDURE — 84100 ASSAY OF PHOSPHORUS: CPT

## 2021-04-28 PROCEDURE — 36430 TRANSFUSION BLD/BLD COMPNT: CPT

## 2021-04-28 PROCEDURE — 70450 CT HEAD/BRAIN W/O DYE: CPT

## 2021-04-28 PROCEDURE — 36415 COLL VENOUS BLD VENIPUNCTURE: CPT

## 2021-04-28 PROCEDURE — 96375 TX/PRO/DX INJ NEW DRUG ADDON: CPT

## 2021-04-28 PROCEDURE — 86593 SYPHILIS TEST NON-TREP QUANT: CPT

## 2021-04-28 PROCEDURE — 87591 N.GONORRHOEAE DNA AMP PROB: CPT

## 2021-04-28 PROCEDURE — 74176 CT ABD & PELVIS W/O CONTRAST: CPT

## 2021-04-28 PROCEDURE — 86357 NK CELLS TOTAL COUNT: CPT

## 2021-04-28 PROCEDURE — 87635 SARS-COV-2 COVID-19 AMP PRB: CPT

## 2021-04-28 PROCEDURE — 84300 ASSAY OF URINE SODIUM: CPT

## 2021-04-28 PROCEDURE — 86704 HEP B CORE ANTIBODY TOTAL: CPT

## 2021-04-28 PROCEDURE — 80048 BASIC METABOLIC PNL TOTAL CA: CPT

## 2021-04-28 PROCEDURE — 85025 COMPLETE CBC W/AUTO DIFF WBC: CPT

## 2021-04-28 PROCEDURE — 85610 PROTHROMBIN TIME: CPT

## 2021-04-28 PROCEDURE — 96374 THER/PROPH/DIAG INJ IV PUSH: CPT

## 2021-04-28 PROCEDURE — 99231 SBSQ HOSP IP/OBS SF/LOW 25: CPT

## 2021-04-28 PROCEDURE — 82248 BILIRUBIN DIRECT: CPT

## 2021-04-28 RX ORDER — METRONIDAZOLE 500 MG
1 TABLET ORAL
Qty: 24 | Refills: 0
Start: 2021-04-28 | End: 2021-05-05

## 2021-04-28 RX ORDER — CEFDINIR 250 MG/5ML
1 POWDER, FOR SUSPENSION ORAL
Qty: 16 | Refills: 0
Start: 2021-04-28 | End: 2021-05-05

## 2021-04-28 RX ORDER — ATOVAQUONE 750 MG/5ML
10 SUSPENSION ORAL
Qty: 1 | Refills: 1
Start: 2021-04-28 | End: 2021-06-26

## 2021-04-28 RX ORDER — FLUCONAZOLE 150 MG/1
1 TABLET ORAL
Qty: 8 | Refills: 0
Start: 2021-04-28 | End: 2021-05-05

## 2021-04-28 RX ORDER — CEFPODOXIME PROXETIL 100 MG
1 TABLET ORAL
Qty: 16 | Refills: 0
Start: 2021-04-28 | End: 2021-05-05

## 2021-04-28 RX ORDER — ATOVAQUONE 750 MG/5ML
10 SUSPENSION ORAL
Qty: 300 | Refills: 0
Start: 2021-04-28 | End: 2021-05-27

## 2021-04-28 RX ADMIN — PIPERACILLIN AND TAZOBACTAM 200 GRAM(S): 4; .5 INJECTION, POWDER, LYOPHILIZED, FOR SOLUTION INTRAVENOUS at 06:10

## 2021-04-28 RX ADMIN — PIPERACILLIN AND TAZOBACTAM 200 GRAM(S): 4; .5 INJECTION, POWDER, LYOPHILIZED, FOR SOLUTION INTRAVENOUS at 17:48

## 2021-04-28 RX ADMIN — FLUCONAZOLE 100 MILLIGRAM(S): 150 TABLET ORAL at 18:47

## 2021-04-28 RX ADMIN — PIPERACILLIN AND TAZOBACTAM 200 GRAM(S): 4; .5 INJECTION, POWDER, LYOPHILIZED, FOR SOLUTION INTRAVENOUS at 11:49

## 2021-04-28 RX ADMIN — ATOVAQUONE 1500 MILLIGRAM(S): 750 SUSPENSION ORAL at 11:50

## 2021-04-28 NOTE — PROGRESS NOTE ADULT - PROBLEM SELECTOR PLAN 3
pt p/w BUN/Cr 24/2.08. pt w.o known hx of CKD w/ weeks of N/V/D.  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
pt p/w BUN/Cr 24/2.08. pt w.o known hx of CKD w/ weeks of N/V/D. lytes wnl  -s/p 2.750L NS in ED  -monitor UOP  -f/u urine lytes, serum osm  -consider renal consult

## 2021-04-28 NOTE — PROGRESS NOTE ADULT - PROBLEM SELECTOR PLAN 1
Pt w/ hx of HIV p/w tachycardia bandemia w/ n/v/hematochezia, subjective fever and chills.  -UA positive for UTI  -possible GI source with hx of blood diarrhea and CT AP showing possible colitis  -cxr unremarkable for pna  -CT head w.o acute findings.   -hep panel negative  -lipase negative  -GI following, recs appreciated  Plan:  - c/w zosyn pending the result of urine/blood cultures  -f/u blood cultures  -f/u stool smear cultures, ova, parasites, c diff, GI PCR  -possible colonoscopy    #Hematochezia   pt w/ weeks of bloody diarrhea meeting sirs criteria. likely 2/2 to colitis  -plan as above  -f/u CT abd/pelvis non con Pt w/ hx of HIV p/w tachycardia bandemia w/ n/v/hematochezia, subjective fever and chills.  -UA positive for UTI  -possible GI source with hx of blood diarrhea and CT AP showing possible colitis  -cxr unremarkable for pna  -CT head w.o acute findings.   -hep panel negative  -lipase negative  -GI following, recs appreciated  Plan:  - c/w zosyn pending the result of urine/blood cultures  -f/u blood cultures  -f/u stool smear cultures, ova, parasites, c diff, GI PCR  -possible colonoscopy    #Hematochezia   pt w/ weeks of bloody diarrhea meeting sirs criteria. likely 2/2 to colitis  -plan as above

## 2021-04-28 NOTE — DISCHARGE NOTE NURSING/CASE MANAGEMENT/SOCIAL WORK - PATIENT PORTAL LINK FT
You can access the FollowMyHealth Patient Portal offered by [facility] by registering at the following website: [email or web address]. By joining [employer]’s FollowMyHealth portal, you will also be able to view your health information using other applications (apps) compatible with our system.

## 2021-04-28 NOTE — DISCHARGE NOTE PROVIDER - CARE PROVIDER_API CALL
Zack Simpson)  Internal Medicine  [address], [address] [place] [address]  Phone: [phone]  Fax: [phone]  Follow Up Time: 1 week

## 2021-04-28 NOTE — DISCHARGE NOTE PROVIDER - NSDCCPCAREPLAN_GEN_ALL_CORE_FT
PRINCIPAL DISCHARGE DIAGNOSIS  Diagnosis: Sepsis  Assessment and Plan of Treatment: You came to the hospital because you were having nausea, vomiting, and bloody diarrhea. This with your vital signs and blood work made us believe that you have an infection. Sepsis is your body's response to an infection. We gave you fluids and started you on antibiotics. Additionally, we looked at your urine which showed us that you likely have an infection of your urinary tract. Please continue to take your antibiotics as prescribed to treat this urinary tract infection.  Additionally, because you were having stomach issues, we did a CT scan to get a better look at your abdomen. We saw that you had inflamation of your large intesting called colitis. This could be from an infection or it could be from an autoimmune condition. We had our stomach doctors see you while you were in the hospital and they recommended a colonoscopy to get a better look of what is going on in your large intestine. Please follow up with our HIV doctor to help you schedule a colonoscopy.      SECONDARY DISCHARGE DIAGNOSES  Diagnosis: AIDS  Assessment and Plan of Treatment: When you came to the hospital, we saw that you had HIV. Additionally, your immune system was low enough from this HIV that you qualify as having AIDS. This can be a life threatening condition, but it is treatable. Please follow up with our HIV doctors to manage your HIV and AIDS so that you can be put on medication that can help treat this condition.  Additionally, because your immune system is so weak, we saw that you were at risk of developing other infections. For this reason, we put you on an antibiotic called atovaquone to prevent further infections. You were also found to have thrush in your mouth, which we started you on fluconazole. Please continue to take this medication as prescribed to treat your mouth infection.

## 2021-04-28 NOTE — PROGRESS NOTE ADULT - PROBLEM SELECTOR PLAN 5
pt w/ hx of SS. denies hx of admissions for SS crises. states last blood transfusion was 12/20. not on hydroxyurea  -hg on admission 6.0 likely 2/2 to hematochezia  -haptoglobin elevated, normal bilirubin - unlikely hemolyzing  -transfuse Hg <7  -keep active T&S  -Tylenol prn for pain pt w/ hx of SS. denies hx of admissions for SS crises. states last blood transfusion was 12/20. not on hydroxyurea  -hg on admission 6.0 likely 2/2 to hematochezia  -haptoglobin elevated, normal bilirubin - unlikely hemolyzing  -transfuse Hg <7  -keep active T&S  -Tylenol prn for pain    #AVN of b/l femoral heads  -currently asymtomatic  -f/u with PCP o/p

## 2021-04-28 NOTE — PROGRESS NOTE ADULT - SUBJECTIVE AND OBJECTIVE BOX
GASTROENTEROLOGY PROGRESS NOTE  Patient seen and examined at bedside. Feels well, no BMs. No abd pain, n/v.     PERTINENT REVIEW OF SYSTEMS:  CONSTITUTIONAL: No weakness, fevers or chills  HEENT: No visual changes; No vertigo or throat pain   GASTROINTESTINAL: As above.  NEUROLOGICAL: No numbness or weakness  SKIN: No itching, burning, rashes, or lesions     Allergies    No Known Allergies    Intolerances      MEDICATIONS:  MEDICATIONS  (STANDING):  atovaquone  Suspension 1500 milliGRAM(s) Oral daily  fluconAZOLE   Tablet 100 milliGRAM(s) Oral every 24 hours  piperacillin/tazobactam IVPB.. 3.375 Gram(s) IV Intermittent every 6 hours    MEDICATIONS  (PRN):  aluminum hydroxide/magnesium hydroxide/simethicone Suspension 30 milliLiter(s) Oral every 6 hours PRN Dyspepsia    Vital Signs Last 24 Hrs  T(C): 36.8 (2021 17:21), Max: 37.2 (2021 21:07)  T(F): 98.2 (2021 17:21), Max: 98.9 (2021 21:07)  HR: 94 (2021 17:21) (83 - 94)  BP: 105/66 (2021 17:21) (101/64 - 112/69)  BP(mean): --  RR: 18 (2021 17:21) (18 - 20)  SpO2: 99% (2021 17:21) (94% - 99%)     @ 07:01  -   @ 07:00  --------------------------------------------------------  IN: 650 mL / OUT: 2250 mL / NET: -1600 mL      PHYSICAL EXAM:    General: Well developed; well nourished; in no acute distress  HEENT: MMM, conjunctiva and sclera clear  Gastrointestinal: Soft non-tender non-distended; No rebound or guarding  Skin: Warm and dry. No obvious rash    LABS:                        8.0    3.08  )-----------( 411      ( 2021 14:58 )             25.8         135  |  103  |  16  ----------------------------<  128<H>  4.0   |  19<L>  |  1.53<H>    Ca    9.3      2021 07:11  Phos  3.4       Mg     1.9                 Urinalysis Basic - ( 2021 08:46 )    Color: Yellow / Appearance: Clear / S.010 / pH: x  Gluc: x / Ketone: NEGATIVE  / Bili: Negative / Urobili: 0.2 E.U./dL   Blood: x / Protein: NEGATIVE mg/dL / Nitrite: NEGATIVE   Leuk Esterase: Small / RBC: > 10 /HPF / WBC > 10 /HPF   Sq Epi: x / Non Sq Epi: 0-5 /HPF / Bacteria: Present /HPF                Culture - Urine (collected 2021 09:22)  Source: .Urine None  Final Report (2021 10:29):    No growth    Culture - Blood (collected 2021 18:42)  Source: .Blood Blood-Peripheral  Preliminary Report (2021 19:01):    No growth to date.    Culture - Blood (collected 2021 18:42)  Source: .Blood Blood-Peripheral  Preliminary Report (2021 19:01):    No growth to date.      RADIOLOGY & ADDITIONAL STUDIES:  Reviewed

## 2021-04-28 NOTE — DISCHARGE NOTE PROVIDER - NSDCMRMEDTOKEN_GEN_ALL_CORE_FT
atovaquone 750 mg/5 mL oral suspension: 10 milliliter(s) orally once a day   cefpodoxime 200 mg oral tablet: 1 tab(s) orally 2 times a day   fluconazole 100 mg oral tablet: 1 tab(s) orally every 24 hours  metroNIDAZOLE 500 mg oral tablet: 1 tab(s) orally every 8 hours

## 2021-04-28 NOTE — PROGRESS NOTE ADULT - SUBJECTIVE AND OBJECTIVE BOX
CC: Patient is a 25y old  Male who presents with a chief complaint of     INTERVAL EVENTS: SAEED    SUBJECTIVE / INTERVAL HPI: Patient seen and examined at bedside. Pt denies fevers, chills, chest pain, shortness of breath, cough, abdominal pain, nausea, vomiting, diarrhea, constipation, leg pain/swelling.     ROS: negative unless otherwise stated above.    VITAL SIGNS:  Vital Signs Last 24 Hrs  T(C): 36.8 (2021 09:20), Max: 37.2 (2021 21:07)  T(F): 98.3 (2021 09:20), Max: 98.9 (2021 21:07)  HR: 83 (2021 09:20) (83 - 95)  BP: 101/64 (2021 09:20) (101/64 - 112/69)  BP(mean): --  RR: 20 (2021 09:20) (20 - 20)  SpO2: 99% (2021 09:20) (94% - 99%)      21 @ 07:01  -  21 @ 07:00  --------------------------------------------------------  IN: 650 mL / OUT: 2250 mL / NET: -1600 mL        PHYSICAL EXAM:    General: NAD  HEENT: MMM  Neck: supple  Cardiovascular: +S1/S2; RRR  Respiratory: CTA B/L; no W/R/R  Gastrointestinal: soft, NT/ND  Extremities: WWP; no edema, clubbing or cyanosis  Vascular: 2+ radial, DP/PT pulses B/L  Neurological: AAOx3; no focal deficits    MEDICATIONS:  MEDICATIONS  (STANDING):  atovaquone  Suspension 1500 milliGRAM(s) Oral daily  fluconAZOLE   Tablet 100 milliGRAM(s) Oral every 24 hours  piperacillin/tazobactam IVPB.. 3.375 Gram(s) IV Intermittent every 6 hours    MEDICATIONS  (PRN):  aluminum hydroxide/magnesium hydroxide/simethicone Suspension 30 milliLiter(s) Oral every 6 hours PRN Dyspepsia      ALLERGIES:  Allergies    No Known Allergies    Intolerances        LABS:                        7.3    3.21  )-----------( 415      ( 2021 07:11 )             23.4         135  |  103  |  16  ----------------------------<  128<H>  4.0   |  19<L>  |  1.53<H>    Ca    9.3      2021 07:11  Phos  3.4       Mg     1.9         TPro  x   /  Alb  x   /  TBili  0.4  /  DBili  0.2  /  AST  x   /  ALT  x   /  AlkPhos  x         Urinalysis Basic - ( 2021 08:46 )    Color: Yellow / Appearance: Clear / S.010 / pH: x  Gluc: x / Ketone: NEGATIVE  / Bili: Negative / Urobili: 0.2 E.U./dL   Blood: x / Protein: NEGATIVE mg/dL / Nitrite: NEGATIVE   Leuk Esterase: Small / RBC: > 10 /HPF / WBC > 10 /HPF   Sq Epi: x / Non Sq Epi: 0-5 /HPF / Bacteria: Present /HPF      CAPILLARY BLOOD GLUCOSE          RADIOLOGY & ADDITIONAL TESTS: Reviewed.

## 2021-04-28 NOTE — PROGRESS NOTE ADULT - ASSESSMENT
24yo male w/ pmh of SS and HIV (diagnosed 6 years ago not on HAART) presenting from [facility] w/ 2-3 weeks of nausea, non bloody non bilious vomiting, diarrhea w/ bright red blood and generalized abdominal pain. GI consulted for n/v/d, blood in stool.     #Nausea, vomiting, diarrhea, blood in stool  - prodrome appears to be linked to symptoms of burning w/ urination, sensation of penile tingling, and one episode of small amount of blood in stool  - given severe immunodeficiency, along w/ fevers/chills, infectious etiologies are highest on differential  - agree w/ GI PCR, Neisseria, stool O+P, culture, C diff  - would also obtain syphilis studies  - CT shows possible colitis in transverse and ascending though study done w/o IV contrast  - plan for outpatient evaluation and possible colonoscopy   - will schedule patient for f/u; office will call him to schedule appt    Thank you for allowing us to participate in the care of this patient.  GI will sign off. Please call back with any questions or concerns.     Ashlie Colon MD  PGY-4, Gastroenterology Fellow  pager: [phone]
 Mr Renteria is a 24yo male w/ pmh of SS and HIV (diagnosed 6 years ago not on HAART) presenting from [facility] w/ 2-3 weeks of nausea, non bloody non bilious vomiting, diarrhea w/ bright red blood and generalized abdominal pain.
25M w/ pmh of SS and HIV (diagnosed 6 years ago not on HAART) that presented from [facility] w/ 2-3 weeks of nausea, non bloody non bilious vomiting, diarrhea w/ bright red blood and generalized abdominal pain found to have UTI.

## 2021-04-28 NOTE — PROGRESS NOTE ADULT - PROBLEM SELECTOR PLAN 7
Fluids: none  Electrolytes: Mg>2, K>4  Nutrition:  No IVF currently needed, replete lytes PRN  Prophylaxis: Lovenox   Activity: AAT, OOBTC  GI: none  C: FC  Dispo: Admit to [facility]

## 2021-04-28 NOTE — PROGRESS NOTE ADULT - ATTENDING COMMENTS
24 yo male with a hx of HIV but not on HAART. Questionable SSA. He was brought in with a complaint of n/v/d as well as abdominal  pain  and melana. + fever and chills, sore throat and difficulty with hearing. H/H on arrival was 6/19 for which he was typed and cross matched, transfused 2 units of PRBC. H/H this am is 7./23 from 5.4/17. A repeat this afternoon is pending.  Found to have sepsis, anemia, mild hyponatremia, ENMANUEL, ? colitis and transaminitis. GI and HIV specialists consulted. Clinically improving with respect to the renal status,          Will await H/H post transfusionally. Awaiting results of stool studies, GC/Chlamydia pcr, blood cxs as well. UA (+) and will dc Cipro/Flagyl. Resume Zosyn. S/P IVF administration, Repeat labs for renal status monitoring. CT abd/pelvis ordered yesterday is still pending to determine etiology of sxs . GI to consider inpatient flex sig/colonoscopy. 24 yo male with a hx of HIV but not on HAART. Questionable SSA. He was brought in with a complaint of n/v/d as well as abdominal  pain  and melana. + fever and chills, sore throat and difficulty with hearing. H/H on arrival was 6/19 for which he was typed and cross matched, transfused 2 units of PRBC. H/H this am is 8/26 from 5.4/17. A repeat this afternoon is pending.  Found to have sepsis, anemia, mild hyponatremia, ENMANUEL, ? colitis and transaminitis. GI and HIV specialists consulted. Clinically improving with respect to the renal status, hyponatremia and colitis but clinically still weak appearing. Urine and blood cxs are negative thus far but GC/Chlamydia results are pending. Continue Zosyn (which will be changed to an oral abx in am) and will consider adding Bactrim since patient's Abs CD4 is <10. GI is considering flex sigmoid/colonoscopy. Dispo is pending GC/Chlamydia result, GI thoughts and clinical stability

## 2021-04-28 NOTE — PROGRESS NOTE ADULT - PROBLEM SELECTOR PLAN 4
pt w/ hx of SS. p/w Hg of 6.0 w/ hematochezia  -pt bilirubin wnl, haptoglobin elevated - unlikely hemolyzing  -ordered 2 units, post transfusion cbc 7.5  -transfuse Hg <7  -keep active T&S

## 2021-04-28 NOTE — PROGRESS NOTE ADULT - PROBLEM SELECTOR PLAN 6
pt p/w plt 412 likely component of dehydration and reactive in setting of infection.  -cont to monitor
pt p/w plt 412 likely component of dehydration and reactive in setting of infection.  -cont to monitor

## 2021-04-28 NOTE — PROGRESS NOTE ADULT - PROBLEM SELECTOR PLAN 2
Pt w/ approx year hx of HIV. not on HAART. p/w leukopenia wbc 3.0 and oral thrush.  -f/u GC/Chl urine  - T cell subset shows CD4 count 10  - started prophylaxis with atovaquone  - f/u OI w/u  #Thrush  -pt w/ hx of HIV and leukopenia p/w oral thrush.   -c/w oral fluconazole Pt w/ approx year hx of HIV. not on HAART. p/w leukopenia wbc 3.0 and oral thrush.  -f/u GC/Chl urine  - T cell subset shows CD4 count 10  - started prophylaxis with atovaquone  - f/u OI w/u  - f/u HIV recs for starting HAART after OI w/u back    #Thrush  -pt w/ hx of HIV and leukopenia p/w oral thrush.   -c/w oral fluconazole

## 2021-04-28 NOTE — DISCHARGE NOTE PROVIDER - HOSPITAL COURSE
#Discharge: do not delete    25M w/ pmh of SS and HIV (diagnosed 6 years ago not on HAART) that presented from [facility] w/ 2-3 weeks of nausea, non bloody non bilious vomiting, diarrhea w/ bright red blood and generalized abdominal pain found to have UTI.    Problem List/Main Diagnoses (system-based):     #Sepsis  -Pt w/ hx of HIV p/w tachycardia bandemia w/ n/v/hematochezia, subjective fever and chills.  -UA positive for >10 WBCs, but UCx negative  -possibly UTI vs urethritis from STI in the setting of HIV  -possible GI source with hx of bloody diarrhea and CT AP showing possible colitis  -cxr unremarkable for pna  -CT head w.o acute findings.   -hep panel negative  -lipase negative  -was unable to send stool cultures, ova, parasites, c diff, GI PCR as pt not having BMs  -initially started on zosyn (4/27-)   Plan:  - c/w zosyn pending the result of urine/blood cultures  -colonoscopy as o/p    #Hematochezia   pt w/ weeks of bloody diarrhea meeting sirs criteria. likely 2/2 to colitis with CT showing evidence of colitis    #AIDS  - Pt w/ approx year hx of HIV. not on HAART. p/w leukopenia wbc 3.0 and oral thrush.  - f/u GC/Chl urine  - T cell subset shows CD4 count 10  - also presented with oral thrush  Plan:  - cont oral fluconazole x14 days (4/26-5/9)  - started prophylaxis with atovaquone  - f/u OI w/u       #Renal insufficiency  - Pt p/w BUN/Cr 24/2.08. pt w.o known hx of CKD w/ weeks of N/V/D.  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 p/w Hg of 6.0 w/ hematochezia  -pt bilirubin wnl, haptoglobin elevated - unlikely hemolyzing  -f/u Fe studies as o/p as received 2u pRBCs while inpatient    #Sickle cell anemia  -Pt w/ hx of SS. denies hx of admissions for SS crises. states last blood transfusion was 12/20. not on hydroxyurea  -hg on admission 6.0 likely 2/2 to hematochezia  -haptoglobin elevated, normal bilirubin - unlikely hemolyzing    #AVN of b/l femoral heads  -currently asymtomatic  -f/u outpatient     Inpatient treatment course:  New medications:   Labs to be followed outpatient:   Exam to be followed outpatient:    #Discharge: do not delete    25M w/ pmh of SS and HIV (diagnosed 6 years ago not on HAART) that presented from [facility] w/ 2-3 weeks of nausea, non bloody non bilious vomiting, diarrhea w/ bright red blood and generalized abdominal pain found to have UTI.    Problem List/Main Diagnoses (system-based):     #Sepsis  -Pt w/ hx of HIV p/w tachycardia bandemia w/ n/v/hematochezia, subjective fever and chills.  -UA positive for >10 WBCs, but UCx negative  -possibly UTI vs urethritis from STI in the setting of HIV  -possible GI source with hx of bloody diarrhea and CT AP showing possible colitis  -cxr unremarkable for pna  -CT head w.o acute findings.   -hep panel negative  -lipase negative  -was unable to send stool cultures, ova, parasites, c diff, GI PCR as pt not having BMs  -initially started on zosyn but switched to cefpodoxime and flagyl for total of 10 days (4/27- 5/6)  Plan:  - cont cefpodoxime and flagyl for total of 10 days (4/27- 5/6)  -colonoscopy as o/p    #Hematochezia   pt w/ weeks of bloody diarrhea meeting sirs criteria. likely 2/2 to colitis with CT showing evidence of colitis  -colonoscopy as o/p    #AIDS  - Pt w/ approx year hx of HIV. not on HAART. p/w leukopenia wbc 3.0 and oral thrush.  - f/u GC/Chl urine  - T cell subset shows CD4 count 10  - also presented with oral thrush  Plan:  - cont oral fluconazole x14 days (4/26-5/9)  - started prophylaxis with atovaquone  - f/u OI w/u       #Renal insufficiency  - Pt p/w BUN/Cr 24/2.08. pt w.o known hx of CKD w/ weeks of N/V/D.  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 p/w Hg of 6.0 w/ hematochezia  -pt bilirubin wnl, haptoglobin elevated - unlikely hemolyzing  -f/u Fe studies as o/p as received 2u pRBCs while inpatient    #Sickle cell anemia  -Pt w/ hx of SS. denies hx of admissions for SS crises. states last blood transfusion was 12/20. not on hydroxyurea  -hg on admission 6.0 likely 2/2 to hematochezia  -haptoglobin elevated, normal bilirubin - unlikely hemolyzing    #AVN of b/l femoral heads  -currently asymtomatic  -f/u outpatient     Inpatient treatment course: As above  New medications: fluconazole, atovaquone, cefpodoxime, flagyl  Labs to be followed outpatient: BMP, CD4, VL, opportunistic infections  Exam to be followed outpatient: oral exam for resolution of thrush

## 2021-04-28 NOTE — CHART NOTE - NSCHARTNOTEFT_GEN_A_CORE
O/N Events: SAEED    Subjective/ROS: Denies HA, CP, SOB, n/v, changes in bowel/urinary habits.  12pt ROS otherwise negative.    VITALS  Vital Signs Last 24 Hrs  T(C): 36.8 (2021 09:20), Max: 37.2 (2021 21:07)  T(F): 98.3 (2021 09:20), Max: 98.9 (2021 21:07)  HR: 83 (2021 09:20) (83 - 95)  BP: 101/64 (2021 09:20) (101/64 - 112/69)  BP(mean): --  RR: 20 (2021 09:20) (20 - 20)  SpO2: 99% (2021 09:20) (94% - 99%)    CAPILLARY BLOOD GLUCOSE      PHYSICAL EXAM  General: A&Ox3; NAD; thin  Head: NC/AT; MMM; PERRL; EOMI;  Neck: Supple; no JVD  Respiratory: CTA B/L; no wheezes/crackles   Cardiovascular: Regular rhythm/rate; S1/S2   Gastrointestinal: Soft; NTND; normoactive BS  : Normal appearing penis and scrotum  Extremities: WWP; no edema/cyanosis  Neurological:  CNII-XII grossly intact; no obvious focal deficits    MEDICATIONS  (STANDING):  atovaquone  Suspension 1500 milliGRAM(s) Oral daily  fluconAZOLE   Tablet 100 milliGRAM(s) Oral every 24 hours  piperacillin/tazobactam IVPB.. 3.375 Gram(s) IV Intermittent every 6 hours    MEDICATIONS  (PRN):  aluminum hydroxide/magnesium hydroxide/simethicone Suspension 30 milliLiter(s) Oral every 6 hours PRN Dyspepsia      No Known Allergies      LABS                        8.0    3.08  )-----------( 411      ( 2021 14:58 )             25.8     -    135  |  103  |  16  ----------------------------<  128<H>  4.0   |  19<L>  |  1.53<H>    Ca    9.3      2021 07:11  Phos  3.4     -  Mg     1.9     04-28    TPro  x   /  Alb  x   /  TBili  0.4  /  DBili  0.2  /  AST  x   /  ALT  x   /  AlkPhos  x         Urinalysis Basic - ( 2021 08:46 )    Color: Yellow / Appearance: Clear / S.010 / pH: x  Gluc: x / Ketone: NEGATIVE  / Bili: Negative / Urobili: 0.2 E.U./dL   Blood: x / Protein: NEGATIVE mg/dL / Nitrite: NEGATIVE   Leuk Esterase: Small / RBC: > 10 /HPF / WBC > 10 /HPF   Sq Epi: x / Non Sq Epi: 0-5 /HPF / Bacteria: Present /HPF            IMAGING/EKG/ETC: Reviewed    Assessment and plan:  25M (hetero) w/ AIDS p/w colitis and UTI.    #AIDS - CD4 10, VL 700s -- reports intermittently taking friend's Biktarvy.    - C/w atovaquone for PCP ppx if Cr improves prior to d/c, can switch to bactrim.  - F/u Cryptococcal Ag in the serum, CMV PCR, hepatitis panel, RPR, GC/Chlamydia urine  - Will plan to re-start ARVs pending serology results -- will likely start Symtuza given low CD4 and risk for IRIS.  - RDC and follow up info provided.      #Colitis- At risk for OI.  F/u GI PCR, outpatient c-scope.    #ENMANUEL w/ UTI- Culture w/ NGTD but purulence in urine.  Possibly STD related.  Monitor Cr.      HIV team will continue to follow.

## 2021-04-29 LAB — CMV DNA CSF QL NAA+PROBE: SIGNIFICANT CHANGE UP

## 2021-04-30 LAB — CRYPTOC AG FLD QL: NEGATIVE — SIGNIFICANT CHANGE UP

## 2021-05-01 LAB
CULTURE RESULTS: SIGNIFICANT CHANGE UP
CULTURE RESULTS: SIGNIFICANT CHANGE UP
SPECIMEN SOURCE: SIGNIFICANT CHANGE UP
SPECIMEN SOURCE: SIGNIFICANT CHANGE UP

## 2021-05-04 DIAGNOSIS — N08 GLOMERULAR DISORDERS IN DISEASES CLASSIFIED ELSEWHERE: ICD-10-CM

## 2021-05-04 DIAGNOSIS — K92.1 MELENA: ICD-10-CM

## 2021-05-04 DIAGNOSIS — K52.9 NONINFECTIVE GASTROENTERITIS AND COLITIS, UNSPECIFIED: ICD-10-CM

## 2021-05-04 DIAGNOSIS — B37.0 CANDIDAL STOMATITIS: ICD-10-CM

## 2021-05-04 DIAGNOSIS — A41.9 SEPSIS, UNSPECIFIED ORGANISM: ICD-10-CM

## 2021-05-04 DIAGNOSIS — B20 HUMAN IMMUNODEFICIENCY VIRUS [HIV] DISEASE: ICD-10-CM

## 2021-05-04 DIAGNOSIS — N18.9 CHRONIC KIDNEY DISEASE, UNSPECIFIED: ICD-10-CM

## 2021-05-04 DIAGNOSIS — D57.1 SICKLE-CELL DISEASE WITHOUT CRISIS: ICD-10-CM

## 2021-05-04 DIAGNOSIS — E87.1 HYPO-OSMOLALITY AND HYPONATREMIA: ICD-10-CM

## 2021-05-04 DIAGNOSIS — M87.852 OTHER OSTEONECROSIS, LEFT FEMUR: ICD-10-CM

## 2021-05-04 DIAGNOSIS — N17.9 ACUTE KIDNEY FAILURE, UNSPECIFIED: ICD-10-CM

## 2021-05-04 DIAGNOSIS — D62 ACUTE POSTHEMORRHAGIC ANEMIA: ICD-10-CM

## 2021-05-04 DIAGNOSIS — M87.851 OTHER OSTEONECROSIS, RIGHT FEMUR: ICD-10-CM

## 2021-06-25 PROBLEM — Z00.00 ENCOUNTER FOR PREVENTIVE HEALTH EXAMINATION: Status: ACTIVE | Noted: 2021-06-25

## 2022-08-31 NOTE — ED PROVIDER NOTE - IV ALTEPLASE INCLUSION HIDDEN
gait    8.22.22 ROM DEGREES A/P ROM DEGREES A/P STRENGTH  STRENGTH     LEFT  RIGHT LEFT RIGHT   HIP FLEX   4+ 3+   HIPEXT   2+ 2   HIP ER       HIP IR       HIP ABD   4- 3+   HIP ADD   3+ 3+          KNEE FLEX   5- 5-   KNEE EXT   5- 2+ (unable to fully extend against gravity)          ANKLE DF Lacking 12deg with knee ext Lacking 15deg with knee ext 2- 2-                PF   4 2+                INV                    EVER             Modalities:   Precautions:  Exercises:   Exercise RLE Reps/ Time Weight/ Level Comments   Nu step  5' Level 4 Inc resistance 8.26   Scapular retraction/elevation mobs 5 min     UT stretch  3x20\"ea     scap squeeze  10x3\"     Pec stretch-doorway  2x30\"     Posterior shoulder rolls 20x  Assist for slow, max retraction   Scap retraction 2x10 Orange  Added resistance 7/13   Scap retract w/ shoulder flex 10x AROM    Chin tucks  10x3\"           Seated toe raises  10x2                 Standing    Parallel bars   Gastroc stretch 3x30\"  UE support; 1 LE at a time    Marches 3x10  UE support   Hip abd 2x10  Cuff on R leg  R hip CKC  L hip OKC   Weight shifting  10xea  No UE as able; M/L, A/P, P/A         Resisted swing phase 3x30'  x13' Lime     Resisted step over small round cone 2x10 Lime  With weight shift onto RLE   Heel taps to step  10x 4\"    Trunk/cervical rotation naming how many fingers clinician is holding  10xea  clinician standing behind pt    Picking up rings from steps  4x 4 rings;   6\" and 12\" step Wider JASIEL no UE; notes some irritation in LB; x2 leading with R UE, x2 leading with L UE and handing to clinician at multiple angles   Resisted swing phase fwd and retro amb on way back  5xea // bars;   Lime  Resisted fwd walk    Step over round cone  10xea // bars           Balance   Parallel bars   Trunk rotation with ball 5x ea     Shoulder press with ball 10x     NBOS 2x30\"  Posterior sway after 20 sec on 1st rep    NBOS with EC 2x30\"     NBOS on foam  2x30\"     NBOS with ball raises, rotation 2x30\"ea Volley  ball     Tandem stance 2x20\"     Cone  off ground 2x5 5 cones    Standing on foam with head turns  30\" ea  Up/down, rotation    Cone reach with LUE across body 2x5 5 cones    Cone reach with LUE to L side 2x5 5 cones    Heel taps 10x ea 4in With 1 hand for UE support   Dynamic march with retro hamstring curl 3L  No UE support   March amb  3L // bars     Hs curl amb  2L // bars     Side stepping 3L // bars  No UE-1 light UE support   360 turn 2x  Clockwise more challenging   Marches  2x20\"  No UE   Alt toe taps to foam  2x30\"  No UE         Gait training with Bioness 50 min Lower and thigh cuff Initial set up  STS 5x  Hip abd  Marches  Adjustments for thigh cuff more proximal  Gait analysis   Change in speed  4 sit down breaks         Ambulation with rollator 2L       Treatment this date included using Functional Electrical Stimulation via the Bioness [id number], Lower caft cuff including the thigh cuff. Stimulation parameters and outcomes can be viewed on [facility] with the patients' username (patient ID is generated by first initial of first name, and first initial of last name, followed by two digit month and two digit day the treatment commenced), and no patient specific password required. A check of the patients' skin prior to application of electrodes, and after the treatment concluded was completed and appropriate. Training Gait mode was used this date. Other:         Treatment Charges: Mins Units   []  Modalities     []  Ther Exercise     []  Manual Therapy     []  Ther Activities     []  Aquatics     []  Vasocompression     []  Other:      [x]  Other: Gait 50 3   Total Treatment time 50 3       Assessment: [x] Progressing toward goals. Gait training with Bioness cuffs with use of walker. Continues to demonstrate mod L ankle instability during weight bearing. EV/IV are bilaterally equal with PROM with min pain along posterior lateral malleolus. Thigh cuff repositioned to upper thigh under glutes for improved recruitment of hamstring fibers. Improvement with genu recurvatum with Bioness; however, continues to demonstrate weak eccentric control of the quads. Plan to trial Xcite training to excite muscle control of quads/hamstring/glutes next session. Current AFO limits DF/PF in neutral, plan to trial AFO with lateral support arm. Adjustments made to intensity and pulse duration for optimal stimulation. [] No change. [] Other:   [x] Patient would continue to benefit from skilled physical therapy services in order to: address R hemibody weakness, improve gait mechanics and efficiency, and increase standing static/dynamic balance to allow improved household ADL/IADL completion and reduce fall risk. STG: (to be met in 8 treatments) - Assessed by Chika Myers PT on 7/20:  ? Pain: No more than 4/10 pain reported in low back or neck post therapy sessions to indicate improving tolerance to increased activity and exercise - MET for low back or neck, but L shoulder is 2/10 - newer pain over the past 3 weeks. ? ROM: at least 0deg DF PROM with knee extended to indicate improving mobility in ankles to increase heel strike and reduce toe drag with prolonged ambulation - NOT MET, lacking 10 deg from neutral in L ankle, lacking 3 deg from neutral in R ankle  ? Balance:   Pt able to fully turn to look behind himself without instability in either direction to indicate improving postural stability - NOT MET, LOB with full turn to R and L  Pt able to complete standing reaching tasks without UE assist and no more than mild instability to indicate improving dynamic balance for safety with household IADLs - NOT MET, primarily needs to be supported at trunk on counter or holding onto something  ? Strength:    At least 4+/5 grossly in R hip to allow improved pelvic stability in standing and improved swing phase control/speed during prolonged gait - NOT MET, 4-/5 grossly in R hip  Pt able to utilize RUE for fine grasp and release tasks with no more than minimally slowed speed evident - Ongoing, better fine grasp but still limited with heavier objects, twisting lid  ? Function: Pt able to ambulate 450 ft with least restrictive device with no evidence of lagging RLE swing or abnormal stance time noted, with no more than 11 on RPE scale - NOT MET, achieves ~400 ft before needing to rest d/t RLE instability and R knee buckling, rates 13 RPE  Patient to be independent with home exercise program as demonstrated by performance with correct form without cues. - Ongoing   Demonstrate Knowledge of fall prevention - MET     LTG: (to be met in 16 treatments) - 8/22:will continue with current goals, adjusting at visit 16 as needed d/t recent CVA on 8/8  ? Pain: No more than 2/10 pain reported in low back or neck post therapy sessions to indicate improving tolerance to increased activity and exercise  ? ROM: at least 5 deg DF PROM with knee extended to indicate improving mobility in ankles to increase heel strike and reduce toe drag with prolonged ambulation  ? Balance: At least 30/56 on Hernandez to indicate significant improvement in standing static/dynamic balance and progress towards reduced fall risk  Able to negotiate gait obstacles using rollator without increased instability or excessively slowed gait  ? Strength: At least 5-/5 grossly in R hip to allow further improved pelvic stability in standing and improved swing phase control/speed during prolonged gait  At least 5/5 B knee ext to prevent excessive buckling with prolonged standing  ?  Function:   Pt able to ambulate 600 ft with least restrictive device with appropriate RLE swing/stance phase timing, no evidence of toe drag, and no more than minimal fatigue by end of ambulation  Pt able to ambulate 100 ft with intermittent turning, stopping, etc without assistive device and demonstrating good stability and no evidence of B knee buckling to indicate improving functional strength and safety with household ambulation distances for ADL/IADLs  No more than 14% impaired on ABC scale to indicate improving subjective balance confidence        Patient goals: \"to strengthen right side back as much as I can\"    Pt. Education:  [x] Yes  [] No  [] Reviewed Prior HEP/Ed  Method of Education: [x] Verbal  [x] Demo  [] Written   fall prevention 7/11/22  Comprehension of Education:  [x] Verbalizes understanding. [x] Demonstrates understanding. [] Needs review. [] Demonstrates/verbalizes HEP/Ed previously given. Plan: [x] Continue current frequency toward long and short term goals.     [x] Specific Instructions for subsequent treatments:  gait training with Bioness, trial of Xcite        Time In: 8:00 am            Time Out: 8:55 am    Electronically signed by:  Ty Mast PTA show